# Patient Record
Sex: FEMALE | Race: WHITE | NOT HISPANIC OR LATINO | Employment: PART TIME
[De-identification: names, ages, dates, MRNs, and addresses within clinical notes are randomized per-mention and may not be internally consistent; named-entity substitution may affect disease eponyms.]

---

## 2017-05-27 ENCOUNTER — HEALTH MAINTENANCE LETTER (OUTPATIENT)
Age: 37
End: 2017-05-27

## 2017-10-21 ENCOUNTER — ALLIED HEALTH/NURSE VISIT (OUTPATIENT)
Dept: NURSING | Facility: CLINIC | Age: 37
End: 2017-10-21
Payer: COMMERCIAL

## 2017-10-21 DIAGNOSIS — Z23 NEED FOR PROPHYLACTIC VACCINATION AND INOCULATION AGAINST INFLUENZA: Primary | ICD-10-CM

## 2017-10-21 PROCEDURE — 90686 IIV4 VACC NO PRSV 0.5 ML IM: CPT

## 2017-10-21 PROCEDURE — 99207 ZZC NO CHARGE NURSE ONLY: CPT

## 2017-10-21 PROCEDURE — 90471 IMMUNIZATION ADMIN: CPT

## 2017-10-21 NOTE — MR AVS SNAPSHOT
After Visit Summary   10/21/2017    Jaqui Santos    MRN: [id number]           Patient Information     Date Of Birth          1980        Visit Information        Provider Department      10/21/2017 10:55 AM [facility]        Today's Diagnoses     Need for prophylactic vaccination and inoculation against influenza    -  1       Follow-ups after your visit        Who to contact     If you have questions or need follow up information about today's clinic visit or your schedule please contact [facility] directly at [phone].  Normal or non-critical lab and imaging results will be communicated to you by HYGIEIAhart, letter or phone within 4 business days after the clinic has received the results. If you do not hear from us within 7 days, please contact the clinic through Horizontal Systemst or phone. If you have a critical or abnormal lab result, we will notify you by phone as soon as possible.  Submit refill requests through RevoDeals or call your pharmacy and they will forward the refill request to us. Please allow 3 business days for your refill to be completed.          Additional Information About Your Visit        MyChart Information     RevoDeals gives you secure access to your electronic health record. If you see a primary care provider, you can also send messages to your care team and make appointments. If you have questions, please call your primary care clinic.  If you do not have a primary care provider, please call [phone] and they will assist you.        Care EveryWhere ID     This is your Care EveryWhere ID. This could be used by other organizations to access your [facility] medical records  [id number]         Blood Pressure from Last 3 Encounters:   No data found for BP    Weight from Last 3 Encounters:   No data found for Wt              We Performed the Following     FLU VAC, SPLIT VIRUS IM > 3 YO (QUADRIVALENT)  [[id number]]     Vaccine Administration, Initial [[id number]]        Primary Care Provider    None Specified       No primary provider on file.        Equal Access to Services     THEO HERMAN : Hadii aad ku hadmihirkaren Mayelinmateus, gerrifranc romeroshainaha, kearazoey tompkinsjessikafranc de la rosadanefranc, dorothy denissein hayaagrace de la rosapuja clarisatamy zeferino lizarraga. So [facility] [phone].    ATENCIÓN: Si habla español, tiene a fuller disposición servicios gratuitos de asistencia lingüística. Llame al [phone].    We comply with applicable federal civil rights laws and [place] laws. We do not discriminate on the basis of race, color, national origin, age, disability, sex, sexual orientation, or gender identity.            Thank you!     Thank you for choosing [facility]  for your care. Our goal is always to provide you with excellent care. Hearing back from our patients is one way we can continue to improve our services. Please take a few minutes to complete the written survey that you may receive in the mail after your visit with us. Thank you!             Your Updated Medication List - Protect others around you: Learn how to safely use, store and throw away your medicines at www.disposemymeds.org.          This list is accurate as of: 10/21/17 11:50 AM.  Always use your most recent med list.                   Brand Name Dispense Instructions for use Diagnosis    FISH OIL PO      Take  by mouth.        IBUPROFEN PO      Take  by mouth.

## 2018-07-13 ENCOUNTER — RECORDS - HEALTHEAST (OUTPATIENT)
Dept: ADMINISTRATIVE | Facility: OTHER | Age: 38
End: 2018-07-13

## 2018-07-13 ENCOUNTER — PRENATAL OFFICE VISIT - HEALTHEAST (OUTPATIENT)
Dept: MIDWIFE SERVICES | Facility: CLINIC | Age: 38
End: 2018-07-13

## 2018-07-13 DIAGNOSIS — Z77.011 LEAD EXPOSURE: ICD-10-CM

## 2018-07-13 DIAGNOSIS — O09.521 ELDERLY MULTIGRAVIDA IN FIRST TRIMESTER: ICD-10-CM

## 2018-07-13 DIAGNOSIS — Z98.891 HISTORY OF CESAREAN SECTION: ICD-10-CM

## 2018-07-13 DIAGNOSIS — N89.8 VAGINAL IRRITATION: ICD-10-CM

## 2018-07-13 DIAGNOSIS — B37.31 VAGINAL YEAST INFECTION: ICD-10-CM

## 2018-07-13 DIAGNOSIS — O09.529 SUPERVISION OF HIGH-RISK PREGNANCY OF ELDERLY MULTIGRAVIDA: ICD-10-CM

## 2018-07-13 LAB
ABO + RH BLD: NORMAL
ABO + RH BLD: NORMAL
ALBUMIN UR-MCNC: NEGATIVE MG/DL
APPEARANCE UR: CLEAR
BASOPHILS # BLD AUTO: 0.1 THOU/UL (ref 0–0.2)
BASOPHILS NFR BLD AUTO: 1 % (ref 0–2)
BILIRUB UR QL STRIP: NEGATIVE
BLD GP AB SCN SERPL QL: NEGATIVE
CLUE CELLS: ABNORMAL
COLOR UR AUTO: YELLOW
EOSINOPHIL # BLD AUTO: 0.1 THOU/UL (ref 0–0.4)
EOSINOPHIL NFR BLD AUTO: 1 % (ref 0–6)
ERYTHROCYTE [DISTWIDTH] IN BLOOD BY AUTOMATED COUNT: 13.6 % (ref 11–14.5)
GLUCOSE UR STRIP-MCNC: NEGATIVE MG/DL
HBV SURFACE AG SERPL QL IA: NORMAL
HCT VFR BLD AUTO: 37.7 % (ref 35–47)
HGB BLD-MCNC: 12.8 G/DL (ref 12–16)
HGB UR QL STRIP: NEGATIVE
HIV 1+2 AB+HIV1 P24 AG SERPL QL IA: NEGATIVE
HIV 1+2 AB+HIV1 P24 AG SERPL QL IA: NORMAL
KETONES UR STRIP-MCNC: NEGATIVE MG/DL
LEUKOCYTE ESTERASE UR QL STRIP: NEGATIVE
LYMPHOCYTES # BLD AUTO: 1.5 THOU/UL (ref 0.8–4.4)
LYMPHOCYTES NFR BLD AUTO: 17 % (ref 20–40)
MCH RBC QN AUTO: 31.5 PG (ref 27–34)
MCHC RBC AUTO-ENTMCNC: 34 G/DL (ref 32–36)
MCV RBC AUTO: 93 FL (ref 80–100)
MONOCYTES # BLD AUTO: 0.5 THOU/UL (ref 0–0.9)
MONOCYTES NFR BLD AUTO: 6 % (ref 2–10)
NEUTROPHILS # BLD AUTO: 6.6 THOU/UL (ref 2–7.7)
NEUTROPHILS NFR BLD AUTO: 75 % (ref 50–70)
NITRATE UR QL: NEGATIVE
PH UR STRIP: 7 [PH] (ref 5–8)
PLATELET # BLD AUTO: 264 THOU/UL (ref 140–440)
PMV BLD AUTO: 9 FL (ref 8.5–12.5)
RBC # BLD AUTO: 4.06 MILL/UL (ref 3.8–5.4)
RUBELLA ABY IGG: NORMAL
SP GR UR STRIP: 1.01 (ref 1–1.03)
TRICHOMONAS, WET PREP: ABNORMAL
UROBILINOGEN UR STRIP-ACNC: NORMAL
WBC: 8.8 THOU/UL (ref 4–11)
YEAST, WET PREP: ABNORMAL

## 2018-07-13 ASSESSMENT — MIFFLIN-ST. JEOR: SCORE: 1280.97

## 2018-07-14 ENCOUNTER — COMMUNICATION - HEALTHEAST (OUTPATIENT)
Dept: MIDWIFE SERVICES | Facility: CLINIC | Age: 38
End: 2018-07-14

## 2018-07-14 LAB
ANTIBODY SCREEN: NEGATIVE
BACTERIA SPEC CULT: NORMAL
COLLECTION METHOD: NORMAL
HBV SURFACE AG SERPL QL IA: NEGATIVE
LEAD BLD-MCNC: NORMAL UG/DL
LEAD RETEST: NO
T PALLIDUM AB SER QL: NEGATIVE

## 2018-07-16 ENCOUNTER — COMMUNICATION - HEALTHEAST (OUTPATIENT)
Dept: OBGYN | Facility: CLINIC | Age: 38
End: 2018-07-16

## 2018-07-16 LAB
ABO/RH(D): NORMAL
ABORH REPEAT: NORMAL
GUARDIAN FIRST NAME: NORMAL
GUARDIAN LAST NAME: NORMAL
HEALTH CARE PROVIDER CITY: NORMAL
HEALTH CARE PROVIDER NAME: NORMAL
HEALTH CARE PROVIDER PHONE: NORMAL
HEALTH CARE PROVIDER STATE: NORMAL
HEALTH CARE PROVIDER STREET ADDRESS: NORMAL
HEALTH CARE PROVIDER ZIP CODE: NORMAL
HPV SOURCE: NORMAL
HUMAN PAPILLOMA VIRUS 16 DNA: NEGATIVE
HUMAN PAPILLOMA VIRUS 18 DNA: NEGATIVE
HUMAN PAPILLOMA VIRUS FINAL DIAGNOSIS: NORMAL
HUMAN PAPILLOMA VIRUS OTHER HR: NEGATIVE
LEAD, B: <1 MCG/DL (ref 0–4.9)
PATIENT CITY: NORMAL
PATIENT COUNTY: NORMAL
PATIENT EMPLOYER: NORMAL
PATIENT ETHNICITY: NORMAL
PATIENT HOME PHONE: NORMAL
PATIENT OCCUPATION: NORMAL
PATIENT RACE: NORMAL
PATIENT STATE: NORMAL
PATIENT STREET ADDRESS: NORMAL
PATIENT ZIP CODE: NORMAL
RUBV IGG SERPL QL IA: POSITIVE
SPECIMEN DESCRIPTION: NORMAL
SUBMITTING LABORATORY PHONE: NORMAL
VENOUS/CAPILLARY: NORMAL

## 2018-07-23 LAB

## 2018-07-25 ENCOUNTER — COMMUNICATION - HEALTHEAST (OUTPATIENT)
Dept: MIDWIFE SERVICES | Facility: CLINIC | Age: 38
End: 2018-07-25

## 2018-07-25 DIAGNOSIS — O09.521 ELDERLY MULTIGRAVIDA IN FIRST TRIMESTER: ICD-10-CM

## 2018-07-27 ENCOUNTER — PRENATAL OFFICE VISIT - HEALTHEAST (OUTPATIENT)
Dept: MIDWIFE SERVICES | Facility: CLINIC | Age: 38
End: 2018-07-27

## 2018-07-27 DIAGNOSIS — M62.08 DIASTASIS RECTI: ICD-10-CM

## 2018-07-27 ASSESSMENT — MIFFLIN-ST. JEOR: SCORE: 1290.95

## 2018-07-29 ENCOUNTER — COMMUNICATION - HEALTHEAST (OUTPATIENT)
Dept: MIDWIFE SERVICES | Facility: CLINIC | Age: 38
End: 2018-07-29

## 2018-08-17 ENCOUNTER — PRENATAL OFFICE VISIT - HEALTHEAST (OUTPATIENT)
Dept: MIDWIFE SERVICES | Facility: CLINIC | Age: 38
End: 2018-08-17

## 2018-08-17 DIAGNOSIS — O09.529 SUPERVISION OF HIGH-RISK PREGNANCY OF ELDERLY MULTIGRAVIDA: ICD-10-CM

## 2018-08-17 ASSESSMENT — MIFFLIN-ST. JEOR: SCORE: 1310.45

## 2018-08-21 LAB
AFP MOM: 1.29 MOM
ALPHA FETO PROTEIN: 46.6 NG/ML
CALCULATED AGE AT EDD: NORMAL
COLLECTION DATE: NORMAL
CURRENT CIGARETTE SMOKING STATUS: NORMAL
EDD BY LMP: NORMAL
GA ON COLLECTION BY DATES: NORMAL WK,D
GA USED IN RISK ESTIMATE: NORMAL
GENERAL TEST INFORMATION: NORMAL
INITIAL OR REPEAT TESTING: NORMAL
INSULIN DIABETIC: NO
INTERPRETATION: NORMAL
IVF PREGNANCY: NO
Lab: NORMAL
NEURAL TUBE DEFECT RISK ESTIMATE: NORMAL
NUMBER OF CHORIONS: NORMAL
NUMBER OF FETUSES:: 1
PATIENT OR FATHER OF BABY HAS A NTD: NO
PATIENT WEIGHT: 141 LBS
PHYSICIAN PHONE NUMBER: NORMAL
PREV PREGNANCY W/ NEURAL TUBE DEFECT: NO
PT DATE OF BIRTH: NORMAL
RACE OF MOTHER: NORMAL
RECOMMENDED FOLLOW UP: NORMAL

## 2018-09-12 ENCOUNTER — COMMUNICATION - HEALTHEAST (OUTPATIENT)
Dept: FAMILY MEDICINE | Facility: CLINIC | Age: 38
End: 2018-09-12

## 2018-09-20 ENCOUNTER — PRENATAL OFFICE VISIT - HEALTHEAST (OUTPATIENT)
Dept: MIDWIFE SERVICES | Facility: CLINIC | Age: 38
End: 2018-09-20

## 2018-09-20 DIAGNOSIS — O09.529 SUPERVISION OF HIGH-RISK PREGNANCY OF ELDERLY MULTIGRAVIDA: ICD-10-CM

## 2018-09-20 ASSESSMENT — MIFFLIN-ST. JEOR: SCORE: 1324.06

## 2018-09-21 ENCOUNTER — HOSPITAL ENCOUNTER (OUTPATIENT)
Dept: ULTRASOUND IMAGING | Facility: CLINIC | Age: 38
Discharge: HOME | End: 2018-09-21
Attending: ADVANCED PRACTICE MIDWIFE

## 2018-09-21 DIAGNOSIS — O09.529 SUPERVISION OF HIGH-RISK PREGNANCY OF ELDERLY MULTIGRAVIDA: ICD-10-CM

## 2018-09-26 ENCOUNTER — AMBULATORY - HEALTHEAST (OUTPATIENT)
Dept: MIDWIFE SERVICES | Facility: CLINIC | Age: 38
End: 2018-09-26

## 2018-10-19 ENCOUNTER — COMMUNICATION - HEALTHEAST (OUTPATIENT)
Dept: FAMILY MEDICINE | Facility: CLINIC | Age: 38
End: 2018-10-19

## 2018-11-08 ENCOUNTER — OFFICE VISIT - HEALTHEAST (OUTPATIENT)
Dept: OBGYN | Facility: CLINIC | Age: 38
End: 2018-11-08

## 2018-11-08 ENCOUNTER — PRENATAL OFFICE VISIT - HEALTHEAST (OUTPATIENT)
Dept: MIDWIFE SERVICES | Facility: CLINIC | Age: 38
End: 2018-11-08

## 2018-11-08 DIAGNOSIS — O09.529 SUPERVISION OF HIGH-RISK PREGNANCY OF ELDERLY MULTIGRAVIDA: ICD-10-CM

## 2018-11-08 DIAGNOSIS — O34.219 H/O CESAREAN SECTION COMPLICATING PREGNANCY: ICD-10-CM

## 2018-11-08 DIAGNOSIS — Z98.891 HISTORY OF CESAREAN SECTION: ICD-10-CM

## 2018-11-08 LAB
FASTING STATUS PATIENT QL REPORTED: NO
GLU GEST SCREEN 1HR 50G: 71
GLUCOSE 1H P 50 G GLC PO SERPL-MCNC: 71 MG/DL (ref 70–139)
HGB BLD-MCNC: 12.4 G/DL (ref 12–16)

## 2018-11-08 ASSESSMENT — MIFFLIN-ST. JEOR
SCORE: 1373.96
SCORE: 1373.96

## 2018-11-16 ENCOUNTER — ALLIED HEALTH/NURSE VISIT (OUTPATIENT)
Dept: NURSING | Facility: CLINIC | Age: 38
End: 2018-11-16
Payer: COMMERCIAL

## 2018-11-16 DIAGNOSIS — Z23 NEED FOR PROPHYLACTIC VACCINATION AND INOCULATION AGAINST INFLUENZA: Primary | ICD-10-CM

## 2018-11-16 PROCEDURE — 90471 IMMUNIZATION ADMIN: CPT

## 2018-11-16 PROCEDURE — 99207 ZZC NO CHARGE NURSE ONLY: CPT

## 2018-11-16 PROCEDURE — 90686 IIV4 VACC NO PRSV 0.5 ML IM: CPT

## 2018-11-16 NOTE — MR AVS SNAPSHOT
After Visit Summary   11/16/2018    Jaqui Santos    MRN: [id number]           Patient Information     Date Of Birth          1980        Visit Information        Provider Department      11/16/2018 11:00 AM CARE COORDINATOR [facility]        Today's Diagnoses     Need for prophylactic vaccination and inoculation against influenza    -  1       Follow-ups after your visit        Who to contact     If you have questions or need follow up information about today's clinic visit or your schedule please contact [facility] directly at [phone].  Normal or non-critical lab and imaging results will be communicated to you by Bungles Jungleshart, letter or phone within 4 business days after the clinic has received the results. If you do not hear from us within 7 days, please contact the clinic through [email or web address] or phone. If you have a critical or abnormal lab result, we will notify you by phone as soon as possible.  Submit refill requests through Metafor Software or call your pharmacy and they will forward the refill request to us. Please allow 3 business days for your refill to be completed.          Additional Information About Your Visit        MyChart Information     [employer] Software gives you secure access to your electronic health record. If you see a primary care provider, you can also send messages to your care team and make appointments. If you have questions, please call your primary care clinic.  If you do not have a primary care provider, please call [phone] and they will assist you.        Care EveryWhere ID     This is your Care EveryWhere ID. This could be used by other organizations to access your [facility] medical records  [id number]         Blood Pressure from Last 3 Encounters:   No data found for BP    Weight from Last 3 Encounters:   No data found for Wt              We Performed the Following     FLU VACCINE, SPLIT VIRUS, IM (QUADRIVALENT)  [[id number]]- >3 YRS     Vaccine Administration, Initial [[id number]]        Primary Care Provider    None Specified       No primary provider on file.        Equal Access to Services     THEO HERMAN : Hadii aad ku hadmihirkaren Blas, gerrifranc romeroshainaha, trav turnerrica henao, dorothy urbina estrellapuja crockett lapriscillagrace zia. So [facility] [place] [phone].    ATENCIÓN: Si habla español, tiene a fuller disposición servicios gratuitos de asistencia lingüística. Llame al [phone].    We comply with applicable federal civil rights laws and [place] laws. We do not discriminate on the basis of race, color, national origin, age, disability, sex, sexual orientation, or gender identity.            Thank you!     Thank you for choosing [facility]  for your care. Our goal is always to provide you with excellent care. Hearing back from our patients is one way we can continue to improve our services. Please take a few minutes to complete the written survey that you may receive in the mail after your visit with us. Thank you!             Your Updated Medication List - Protect others around you: Learn how to safely use, store and throw away your medicines at www.disposemymeds.org.          This list is accurate as of 11/16/18 11:40 AM.  Always use your most recent med list.                   Brand Name Dispense Instructions for use Diagnosis    FISH OIL PO      Take  by mouth.        IBUPROFEN PO      Take  by mouth.

## 2018-12-07 ENCOUNTER — PRENATAL OFFICE VISIT - HEALTHEAST (OUTPATIENT)
Dept: MIDWIFE SERVICES | Facility: CLINIC | Age: 38
End: 2018-12-07

## 2018-12-07 DIAGNOSIS — O09.529 SUPERVISION OF HIGH-RISK PREGNANCY OF ELDERLY MULTIGRAVIDA: ICD-10-CM

## 2018-12-07 ASSESSMENT — MIFFLIN-ST. JEOR: SCORE: 1396.64

## 2018-12-20 ENCOUNTER — PRENATAL OFFICE VISIT - HEALTHEAST (OUTPATIENT)
Dept: MIDWIFE SERVICES | Facility: CLINIC | Age: 38
End: 2018-12-20

## 2018-12-20 DIAGNOSIS — O09.529 SUPERVISION OF HIGH-RISK PREGNANCY OF ELDERLY MULTIGRAVIDA: ICD-10-CM

## 2018-12-20 ASSESSMENT — MIFFLIN-ST. JEOR: SCORE: 1396.64

## 2019-01-03 ENCOUNTER — PRENATAL OFFICE VISIT - HEALTHEAST (OUTPATIENT)
Dept: MIDWIFE SERVICES | Facility: CLINIC | Age: 39
End: 2019-01-03

## 2019-01-03 DIAGNOSIS — O09.529 SUPERVISION OF HIGH-RISK PREGNANCY OF ELDERLY MULTIGRAVIDA: ICD-10-CM

## 2019-01-03 LAB
GROUP B STREP PCR: NEGATIVE
HGB BLD-MCNC: 12.3 G/DL (ref 12–16)

## 2019-01-03 ASSESSMENT — MIFFLIN-ST. JEOR: SCORE: 1405.71

## 2019-01-04 LAB
ALLERGIC TO PENICILLIN: NO
GP B STREP DNA SPEC QL NAA+PROBE: NEGATIVE

## 2019-01-12 ENCOUNTER — COMMUNICATION - HEALTHEAST (OUTPATIENT)
Dept: OBGYN | Facility: HOSPITAL | Age: 39
End: 2019-01-12

## 2019-01-17 ENCOUNTER — COMMUNICATION - HEALTHEAST (OUTPATIENT)
Dept: MIDWIFE SERVICES | Facility: CLINIC | Age: 39
End: 2019-01-17

## 2019-02-07 ENCOUNTER — COMMUNICATION - HEALTHEAST (OUTPATIENT)
Dept: MIDWIFE SERVICES | Facility: CLINIC | Age: 39
End: 2019-02-07

## 2019-02-12 ENCOUNTER — HOSPITAL ENCOUNTER (INPATIENT)
Facility: CLINIC | Age: 39
LOS: 4 days | Discharge: HOME | End: 2019-02-16
Attending: OBSTETRICS & GYNECOLOGY | Admitting: OBSTETRICS & GYNECOLOGY
Payer: COMMERCIAL

## 2019-02-12 DIAGNOSIS — Z98.891 S/P CESAREAN SECTION: Primary | ICD-10-CM

## 2019-02-12 PROCEDURE — 12000001 ZZH R&B MED SURG/OB UMMC

## 2019-02-12 PROCEDURE — G0463 HOSPITAL OUTPT CLINIC VISIT: HCPCS

## 2019-02-12 RX ORDER — NALOXONE HYDROCHLORIDE 0.4 MG/ML
.1-.4 INJECTION, SOLUTION INTRAMUSCULAR; INTRAVENOUS; SUBCUTANEOUS
Status: DISCONTINUED | OUTPATIENT
Start: 2019-02-12 | End: 2019-02-13

## 2019-02-12 RX ORDER — OXYTOCIN/0.9 % SODIUM CHLORIDE 30/500 ML
100-340 PLASTIC BAG, INJECTION (ML) INTRAVENOUS CONTINUOUS PRN
Status: DISCONTINUED | OUTPATIENT
Start: 2019-02-12 | End: 2019-02-13

## 2019-02-12 RX ORDER — PRENATAL VIT/IRON FUM/FOLIC AC 27MG-0.8MG
1 TABLET ORAL DAILY
COMMUNITY

## 2019-02-12 RX ORDER — SODIUM CHLORIDE, SODIUM LACTATE, POTASSIUM CHLORIDE, CALCIUM CHLORIDE 600; 310; 30; 20 MG/100ML; MG/100ML; MG/100ML; MG/100ML
INJECTION, SOLUTION INTRAVENOUS CONTINUOUS
Status: DISCONTINUED | OUTPATIENT
Start: 2019-02-12 | End: 2019-02-13

## 2019-02-12 RX ORDER — ACETAMINOPHEN 325 MG/1
650 TABLET ORAL EVERY 4 HOURS PRN
Status: DISCONTINUED | OUTPATIENT
Start: 2019-02-12 | End: 2019-02-13

## 2019-02-12 RX ORDER — OXYTOCIN 10 [USP'U]/ML
10 INJECTION, SOLUTION INTRAMUSCULAR; INTRAVENOUS
Status: DISCONTINUED | OUTPATIENT
Start: 2019-02-12 | End: 2019-02-13

## 2019-02-12 RX ORDER — ONDANSETRON 2 MG/ML
4 INJECTION INTRAMUSCULAR; INTRAVENOUS EVERY 6 HOURS PRN
Status: DISCONTINUED | OUTPATIENT
Start: 2019-02-12 | End: 2019-02-13

## 2019-02-12 RX ORDER — PENICILLIN G POTASSIUM 5000000 [IU]/1
5 INJECTION, POWDER, FOR SOLUTION INTRAMUSCULAR; INTRAVENOUS ONCE
Status: DISCONTINUED | OUTPATIENT
Start: 2019-02-12 | End: 2019-02-13

## 2019-02-12 RX ORDER — METHYLERGONOVINE MALEATE 0.2 MG/ML
200 INJECTION INTRAVENOUS
Status: DISCONTINUED | OUTPATIENT
Start: 2019-02-12 | End: 2019-02-13

## 2019-02-12 RX ORDER — IBUPROFEN 800 MG/1
800 TABLET, FILM COATED ORAL
Status: DISCONTINUED | OUTPATIENT
Start: 2019-02-12 | End: 2019-02-13

## 2019-02-12 RX ORDER — CARBOPROST TROMETHAMINE 250 UG/ML
250 INJECTION, SOLUTION INTRAMUSCULAR
Status: DISCONTINUED | OUTPATIENT
Start: 2019-02-12 | End: 2019-02-13

## 2019-02-12 RX ORDER — OXYCODONE AND ACETAMINOPHEN 5; 325 MG/1; MG/1
1 TABLET ORAL
Status: DISCONTINUED | OUTPATIENT
Start: 2019-02-12 | End: 2019-02-13

## 2019-02-12 SDOH — HEALTH STABILITY: MENTAL HEALTH: HOW OFTEN DO YOU HAVE A DRINK CONTAINING ALCOHOL?: NEVER

## 2019-02-13 ENCOUNTER — ANESTHESIA (OUTPATIENT)
Dept: OBGYN | Facility: CLINIC | Age: 39
End: 2019-02-13
Payer: COMMERCIAL

## 2019-02-13 ENCOUNTER — ANESTHESIA EVENT (OUTPATIENT)
Dept: OBGYN | Facility: CLINIC | Age: 39
End: 2019-02-13
Payer: COMMERCIAL

## 2019-02-13 PROBLEM — Z98.891 S/P CESAREAN SECTION: Status: ACTIVE | Noted: 2019-02-13

## 2019-02-13 LAB
BASOPHILS # BLD AUTO: 0 10E9/L (ref 0–0.2)
BASOPHILS NFR BLD AUTO: 0.2 %
DIFFERENTIAL METHOD BLD: NORMAL
EOSINOPHIL # BLD AUTO: 0.1 10E9/L (ref 0–0.7)
EOSINOPHIL NFR BLD AUTO: 1.3 %
ERYTHROCYTE [DISTWIDTH] IN BLOOD BY AUTOMATED COUNT: 13.8 % (ref 10–15)
HCT VFR BLD AUTO: 36.3 % (ref 35–47)
HGB BLD-MCNC: 12.5 G/DL (ref 11.7–15.7)
IMM GRANULOCYTES # BLD: 0 10E9/L (ref 0–0.4)
IMM GRANULOCYTES NFR BLD: 0.5 %
LYMPHOCYTES # BLD AUTO: 1.6 10E9/L (ref 0.8–5.3)
LYMPHOCYTES NFR BLD AUTO: 18.1 %
MCH RBC QN AUTO: 32.3 PG (ref 26.5–33)
MCHC RBC AUTO-ENTMCNC: 34.4 G/DL (ref 31.5–36.5)
MCV RBC AUTO: 94 FL (ref 78–100)
MONOCYTES # BLD AUTO: 0.7 10E9/L (ref 0–1.3)
MONOCYTES NFR BLD AUTO: 8.5 %
NEUTROPHILS # BLD AUTO: 6.2 10E9/L (ref 1.6–8.3)
NEUTROPHILS NFR BLD AUTO: 71.4 %
NRBC # BLD AUTO: 0 10*3/UL
NRBC BLD AUTO-RTO: 0 /100
PLATELET # BLD AUTO: 238 10E9/L (ref 150–450)
RBC # BLD AUTO: 3.87 10E12/L (ref 3.8–5.2)
T PALLIDUM AB SER QL: NONREACTIVE
WBC # BLD AUTO: 8.7 10E9/L (ref 4–11)

## 2019-02-13 PROCEDURE — 71000014 ZZH RECOVERY PHASE 1 LEVEL 2 FIRST HR: Performed by: OBSTETRICS & GYNECOLOGY

## 2019-02-13 PROCEDURE — 37000008 ZZH ANESTHESIA TECHNICAL FEE, 1ST 30 MIN: Performed by: OBSTETRICS & GYNECOLOGY

## 2019-02-13 PROCEDURE — 12000001 ZZH R&B MED SURG/OB UMMC

## 2019-02-13 PROCEDURE — 85025 COMPLETE CBC W/AUTO DIFF WBC: CPT | Performed by: STUDENT IN AN ORGANIZED HEALTH CARE EDUCATION/TRAINING PROGRAM

## 2019-02-13 PROCEDURE — 37000009 ZZH ANESTHESIA TECHNICAL FEE, EACH ADDTL 15 MIN: Performed by: OBSTETRICS & GYNECOLOGY

## 2019-02-13 PROCEDURE — 27110028 ZZH OR GENERAL SUPPLY NON-STERILE: Performed by: OBSTETRICS & GYNECOLOGY

## 2019-02-13 PROCEDURE — 25000132 ZZH RX MED GY IP 250 OP 250 PS 637

## 2019-02-13 PROCEDURE — 27210794 ZZH OR GENERAL SUPPLY STERILE: Performed by: OBSTETRICS & GYNECOLOGY

## 2019-02-13 PROCEDURE — 0064U ANTB TP TOTAL&RPR IA QUAL: CPT | Performed by: STUDENT IN AN ORGANIZED HEALTH CARE EDUCATION/TRAINING PROGRAM

## 2019-02-13 PROCEDURE — 25800030 ZZH RX IP 258 OP 636

## 2019-02-13 PROCEDURE — 71000015 ZZH RECOVERY PHASE 1 LEVEL 2 EA ADDTL HR: Performed by: OBSTETRICS & GYNECOLOGY

## 2019-02-13 PROCEDURE — 25000132 ZZH RX MED GY IP 250 OP 250 PS 637: Performed by: STUDENT IN AN ORGANIZED HEALTH CARE EDUCATION/TRAINING PROGRAM

## 2019-02-13 PROCEDURE — 25000125 ZZHC RX 250

## 2019-02-13 PROCEDURE — 36000057 ZZH SURGERY LEVEL 3 1ST 30 MIN - UMMC: Performed by: OBSTETRICS & GYNECOLOGY

## 2019-02-13 PROCEDURE — 25000128 H RX IP 250 OP 636

## 2019-02-13 PROCEDURE — 25000125 ZZHC RX 250: Performed by: STUDENT IN AN ORGANIZED HEALTH CARE EDUCATION/TRAINING PROGRAM

## 2019-02-13 PROCEDURE — 36000059 ZZH SURGERY LEVEL 3 EA 15 ADDTL MIN UMMC: Performed by: OBSTETRICS & GYNECOLOGY

## 2019-02-13 PROCEDURE — C9290 INJ, BUPIVACAINE LIPOSOME: HCPCS

## 2019-02-13 PROCEDURE — 40000170 ZZH STATISTIC PRE-PROCEDURE ASSESSMENT II: Performed by: OBSTETRICS & GYNECOLOGY

## 2019-02-13 PROCEDURE — 25000128 H RX IP 250 OP 636: Performed by: STUDENT IN AN ORGANIZED HEALTH CARE EDUCATION/TRAINING PROGRAM

## 2019-02-13 RX ORDER — KETOROLAC TROMETHAMINE 30 MG/ML
30 INJECTION, SOLUTION INTRAMUSCULAR; INTRAVENOUS EVERY 6 HOURS
Status: DISPENSED | OUTPATIENT
Start: 2019-02-13 | End: 2019-02-14

## 2019-02-13 RX ORDER — OXYCODONE HYDROCHLORIDE 5 MG/1
5-10 TABLET ORAL EVERY 4 HOURS PRN
Status: DISCONTINUED | OUTPATIENT
Start: 2019-02-13 | End: 2019-02-16 | Stop reason: HOSPADM

## 2019-02-13 RX ORDER — NALOXONE HYDROCHLORIDE 0.4 MG/ML
.1-.4 INJECTION, SOLUTION INTRAMUSCULAR; INTRAVENOUS; SUBCUTANEOUS
Status: DISCONTINUED | OUTPATIENT
Start: 2019-02-13 | End: 2019-02-13

## 2019-02-13 RX ORDER — EPHEDRINE SULFATE 50 MG/ML
INJECTION, SOLUTION INTRAMUSCULAR; INTRAVENOUS; SUBCUTANEOUS PRN
Status: DISCONTINUED | OUTPATIENT
Start: 2019-02-13 | End: 2019-02-13

## 2019-02-13 RX ORDER — DEXTROSE, SODIUM CHLORIDE, SODIUM LACTATE, POTASSIUM CHLORIDE, AND CALCIUM CHLORIDE 5; .6; .31; .03; .02 G/100ML; G/100ML; G/100ML; G/100ML; G/100ML
INJECTION, SOLUTION INTRAVENOUS CONTINUOUS
Status: DISCONTINUED | OUTPATIENT
Start: 2019-02-13 | End: 2019-02-16 | Stop reason: HOSPADM

## 2019-02-13 RX ORDER — SODIUM CHLORIDE, SODIUM LACTATE, POTASSIUM CHLORIDE, CALCIUM CHLORIDE 600; 310; 30; 20 MG/100ML; MG/100ML; MG/100ML; MG/100ML
INJECTION, SOLUTION INTRAVENOUS CONTINUOUS PRN
Status: DISCONTINUED | OUTPATIENT
Start: 2019-02-13 | End: 2019-02-13

## 2019-02-13 RX ORDER — CEFAZOLIN SODIUM 1 G/3ML
1 INJECTION, POWDER, FOR SOLUTION INTRAMUSCULAR; INTRAVENOUS SEE ADMIN INSTRUCTIONS
Status: DISCONTINUED | OUTPATIENT
Start: 2019-02-13 | End: 2019-02-13

## 2019-02-13 RX ORDER — MORPHINE SULFATE 1 MG/ML
INJECTION, SOLUTION EPIDURAL; INTRATHECAL; INTRAVENOUS PRN
Status: DISCONTINUED | OUTPATIENT
Start: 2019-02-13 | End: 2019-02-13

## 2019-02-13 RX ORDER — AMOXICILLIN 250 MG
1 CAPSULE ORAL 2 TIMES DAILY PRN
Status: DISCONTINUED | OUTPATIENT
Start: 2019-02-13 | End: 2019-02-16 | Stop reason: HOSPADM

## 2019-02-13 RX ORDER — FENTANYL CITRATE 50 UG/ML
INJECTION, SOLUTION INTRAMUSCULAR; INTRAVENOUS PRN
Status: DISCONTINUED | OUTPATIENT
Start: 2019-02-13 | End: 2019-02-13

## 2019-02-13 RX ORDER — AMOXICILLIN 250 MG
2 CAPSULE ORAL 2 TIMES DAILY PRN
Status: DISCONTINUED | OUTPATIENT
Start: 2019-02-13 | End: 2019-02-16 | Stop reason: HOSPADM

## 2019-02-13 RX ORDER — ONDANSETRON 2 MG/ML
4 INJECTION INTRAMUSCULAR; INTRAVENOUS EVERY 6 HOURS PRN
Status: DISCONTINUED | OUTPATIENT
Start: 2019-02-13 | End: 2019-02-16 | Stop reason: HOSPADM

## 2019-02-13 RX ORDER — OXYTOCIN/0.9 % SODIUM CHLORIDE 30/500 ML
100 PLASTIC BAG, INJECTION (ML) INTRAVENOUS CONTINUOUS
Status: DISCONTINUED | OUTPATIENT
Start: 2019-02-13 | End: 2019-02-16 | Stop reason: HOSPADM

## 2019-02-13 RX ORDER — OXYTOCIN 10 [USP'U]/ML
10 INJECTION, SOLUTION INTRAMUSCULAR; INTRAVENOUS
Status: DISCONTINUED | OUTPATIENT
Start: 2019-02-13 | End: 2019-02-16 | Stop reason: HOSPADM

## 2019-02-13 RX ORDER — MORPHINE SULFATE 1 MG/ML
0.1 INJECTION, SOLUTION EPIDURAL; INTRATHECAL; INTRAVENOUS ONCE
Status: DISCONTINUED | OUTPATIENT
Start: 2019-02-13 | End: 2019-02-13

## 2019-02-13 RX ORDER — SODIUM CHLORIDE, SODIUM LACTATE, POTASSIUM CHLORIDE, CALCIUM CHLORIDE 600; 310; 30; 20 MG/100ML; MG/100ML; MG/100ML; MG/100ML
INJECTION, SOLUTION INTRAVENOUS
Status: COMPLETED
Start: 2019-02-13 | End: 2019-02-13

## 2019-02-13 RX ORDER — ONDANSETRON 2 MG/ML
INJECTION INTRAMUSCULAR; INTRAVENOUS PRN
Status: DISCONTINUED | OUTPATIENT
Start: 2019-02-13 | End: 2019-02-13

## 2019-02-13 RX ORDER — IBUPROFEN 800 MG/1
800 TABLET, FILM COATED ORAL EVERY 6 HOURS PRN
Status: DISCONTINUED | OUTPATIENT
Start: 2019-02-13 | End: 2019-02-16 | Stop reason: HOSPADM

## 2019-02-13 RX ORDER — OXYTOCIN/0.9 % SODIUM CHLORIDE 30/500 ML
PLASTIC BAG, INJECTION (ML) INTRAVENOUS CONTINUOUS PRN
Status: DISCONTINUED | OUTPATIENT
Start: 2019-02-13 | End: 2019-02-13

## 2019-02-13 RX ORDER — SIMETHICONE 80 MG
80 TABLET,CHEWABLE ORAL 4 TIMES DAILY PRN
Status: DISCONTINUED | OUTPATIENT
Start: 2019-02-13 | End: 2019-02-16 | Stop reason: HOSPADM

## 2019-02-13 RX ORDER — SODIUM CHLORIDE, SODIUM LACTATE, POTASSIUM CHLORIDE, CALCIUM CHLORIDE 600; 310; 30; 20 MG/100ML; MG/100ML; MG/100ML; MG/100ML
INJECTION, SOLUTION INTRAVENOUS CONTINUOUS
Status: DISCONTINUED | OUTPATIENT
Start: 2019-02-13 | End: 2019-02-13

## 2019-02-13 RX ORDER — NALOXONE HYDROCHLORIDE 0.4 MG/ML
.1-.4 INJECTION, SOLUTION INTRAMUSCULAR; INTRAVENOUS; SUBCUTANEOUS
Status: DISCONTINUED | OUTPATIENT
Start: 2019-02-13 | End: 2019-02-16 | Stop reason: HOSPADM

## 2019-02-13 RX ORDER — MORPHINE SULFATE 1 MG/ML
INJECTION, SOLUTION EPIDURAL; INTRATHECAL; INTRAVENOUS
Status: DISCONTINUED
Start: 2019-02-13 | End: 2019-02-13 | Stop reason: HOSPADM

## 2019-02-13 RX ORDER — CITRIC ACID/SODIUM CITRATE 334-500MG
30 SOLUTION, ORAL ORAL
Status: DISCONTINUED | OUTPATIENT
Start: 2019-02-13 | End: 2019-02-13

## 2019-02-13 RX ORDER — CEFAZOLIN SODIUM 2 G/100ML
INJECTION, SOLUTION INTRAVENOUS
Status: DISCONTINUED
Start: 2019-02-13 | End: 2019-02-13 | Stop reason: HOSPADM

## 2019-02-13 RX ORDER — LANOLIN 100 %
OINTMENT (GRAM) TOPICAL
Status: DISCONTINUED | OUTPATIENT
Start: 2019-02-13 | End: 2019-02-16 | Stop reason: HOSPADM

## 2019-02-13 RX ORDER — ONDANSETRON 4 MG/1
4 TABLET, ORALLY DISINTEGRATING ORAL EVERY 30 MIN PRN
Status: DISCONTINUED | OUTPATIENT
Start: 2019-02-13 | End: 2019-02-13

## 2019-02-13 RX ORDER — CARBOPROST TROMETHAMINE 250 UG/ML
250 INJECTION, SOLUTION INTRAMUSCULAR
Status: DISCONTINUED | OUTPATIENT
Start: 2019-02-13 | End: 2019-02-16 | Stop reason: HOSPADM

## 2019-02-13 RX ORDER — KETOROLAC TROMETHAMINE 30 MG/ML
INJECTION, SOLUTION INTRAMUSCULAR; INTRAVENOUS PRN
Status: DISCONTINUED | OUTPATIENT
Start: 2019-02-13 | End: 2019-02-13

## 2019-02-13 RX ORDER — CITRIC ACID/SODIUM CITRATE 334-500MG
30 SOLUTION, ORAL ORAL ONCE
Status: COMPLETED | OUTPATIENT
Start: 2019-02-13 | End: 2019-02-13

## 2019-02-13 RX ORDER — EPHEDRINE SULFATE 50 MG/ML
5 INJECTION, SOLUTION INTRAMUSCULAR; INTRAVENOUS; SUBCUTANEOUS
Status: DISCONTINUED | OUTPATIENT
Start: 2019-02-13 | End: 2019-02-13

## 2019-02-13 RX ORDER — ACETAMINOPHEN 325 MG/1
650 TABLET ORAL EVERY 4 HOURS PRN
Status: DISCONTINUED | OUTPATIENT
Start: 2019-02-16 | End: 2019-02-16 | Stop reason: HOSPADM

## 2019-02-13 RX ORDER — HYDROCORTISONE 2.5 %
CREAM (GRAM) TOPICAL 3 TIMES DAILY PRN
Status: DISCONTINUED | OUTPATIENT
Start: 2019-02-13 | End: 2019-02-16 | Stop reason: HOSPADM

## 2019-02-13 RX ORDER — ACETAMINOPHEN 325 MG/1
975 TABLET ORAL EVERY 8 HOURS
Status: COMPLETED | OUTPATIENT
Start: 2019-02-13 | End: 2019-02-16

## 2019-02-13 RX ORDER — BUPIVACAINE HYDROCHLORIDE 7.5 MG/ML
INJECTION, SOLUTION INTRASPINAL
Status: DISCONTINUED
Start: 2019-02-13 | End: 2019-02-13 | Stop reason: HOSPADM

## 2019-02-13 RX ORDER — OXYTOCIN/0.9 % SODIUM CHLORIDE 30/500 ML
340 PLASTIC BAG, INJECTION (ML) INTRAVENOUS CONTINUOUS PRN
Status: DISCONTINUED | OUTPATIENT
Start: 2019-02-13 | End: 2019-02-16 | Stop reason: HOSPADM

## 2019-02-13 RX ORDER — FENTANYL CITRATE 50 UG/ML
25-50 INJECTION, SOLUTION INTRAMUSCULAR; INTRAVENOUS
Status: DISCONTINUED | OUTPATIENT
Start: 2019-02-13 | End: 2019-02-13

## 2019-02-13 RX ORDER — MISOPROSTOL 200 UG/1
800 TABLET ORAL
Status: DISCONTINUED | OUTPATIENT
Start: 2019-02-13 | End: 2019-02-16 | Stop reason: HOSPADM

## 2019-02-13 RX ORDER — BISACODYL 10 MG
10 SUPPOSITORY, RECTAL RECTAL DAILY PRN
Status: DISCONTINUED | OUTPATIENT
Start: 2019-02-15 | End: 2019-02-16 | Stop reason: HOSPADM

## 2019-02-13 RX ORDER — OXYTOCIN/0.9 % SODIUM CHLORIDE 30/500 ML
PLASTIC BAG, INJECTION (ML) INTRAVENOUS
Status: DISCONTINUED
Start: 2019-02-13 | End: 2019-02-13 | Stop reason: HOSPADM

## 2019-02-13 RX ORDER — BUPIVACAINE HYDROCHLORIDE 7.5 MG/ML
INJECTION, SOLUTION INTRASPINAL PRN
Status: DISCONTINUED | OUTPATIENT
Start: 2019-02-13 | End: 2019-02-13

## 2019-02-13 RX ORDER — METHYLERGONOVINE MALEATE 0.2 MG/ML
200 INJECTION INTRAVENOUS
Status: DISCONTINUED | OUTPATIENT
Start: 2019-02-13 | End: 2019-02-16 | Stop reason: HOSPADM

## 2019-02-13 RX ORDER — ONDANSETRON 2 MG/ML
4 INJECTION INTRAMUSCULAR; INTRAVENOUS EVERY 30 MIN PRN
Status: DISCONTINUED | OUTPATIENT
Start: 2019-02-13 | End: 2019-02-13

## 2019-02-13 RX ORDER — LIDOCAINE 40 MG/G
CREAM TOPICAL
Status: DISCONTINUED | OUTPATIENT
Start: 2019-02-13 | End: 2019-02-16 | Stop reason: HOSPADM

## 2019-02-13 RX ORDER — CITRIC ACID/SODIUM CITRATE 334-500MG
SOLUTION, ORAL ORAL
Status: COMPLETED
Start: 2019-02-13 | End: 2019-02-13

## 2019-02-13 RX ORDER — NALBUPHINE HYDROCHLORIDE 10 MG/ML
2.5-5 INJECTION, SOLUTION INTRAMUSCULAR; INTRAVENOUS; SUBCUTANEOUS EVERY 6 HOURS PRN
Status: DISCONTINUED | OUTPATIENT
Start: 2019-02-13 | End: 2019-02-13

## 2019-02-13 RX ORDER — CEFAZOLIN SODIUM 2 G/100ML
2 INJECTION, SOLUTION INTRAVENOUS
Status: COMPLETED | OUTPATIENT
Start: 2019-02-13 | End: 2019-02-13

## 2019-02-13 RX ORDER — LIDOCAINE 40 MG/G
CREAM TOPICAL
Status: DISCONTINUED | OUTPATIENT
Start: 2019-02-13 | End: 2019-02-13

## 2019-02-13 RX ADMIN — Medication 30 ML: at 07:48

## 2019-02-13 RX ADMIN — CEFAZOLIN SODIUM 2 G: 2 INJECTION, SOLUTION INTRAVENOUS at 08:40

## 2019-02-13 RX ADMIN — KETOROLAC TROMETHAMINE 30 MG: 30 INJECTION, SOLUTION INTRAMUSCULAR at 22:22

## 2019-02-13 RX ADMIN — PHENYLEPHRINE HYDROCHLORIDE 100 MCG: 10 INJECTION, SOLUTION INTRAMUSCULAR; INTRAVENOUS; SUBCUTANEOUS at 09:10

## 2019-02-13 RX ADMIN — SODIUM CHLORIDE, POTASSIUM CHLORIDE, SODIUM LACTATE AND CALCIUM CHLORIDE: 600; 310; 30; 20 INJECTION, SOLUTION INTRAVENOUS at 08:24

## 2019-02-13 RX ADMIN — PHENYLEPHRINE HYDROCHLORIDE 0.5 MCG: 10 INJECTION, SOLUTION INTRAMUSCULAR; INTRAVENOUS; SUBCUTANEOUS at 08:41

## 2019-02-13 RX ADMIN — OXYTOCIN-SODIUM CHLORIDE 0.9% IV SOLN 30 UNIT/500ML 300 ML/HR: 30-0.9/5 SOLUTION at 09:03

## 2019-02-13 RX ADMIN — OXYTOCIN-SODIUM CHLORIDE 0.9% IV SOLN 30 UNIT/500ML 100 ML/HR: 30-0.9/5 SOLUTION at 12:39

## 2019-02-13 RX ADMIN — PHENYLEPHRINE HYDROCHLORIDE 100 MCG: 10 INJECTION, SOLUTION INTRAMUSCULAR; INTRAVENOUS; SUBCUTANEOUS at 09:16

## 2019-02-13 RX ADMIN — KETOROLAC TROMETHAMINE 30 MG: 30 INJECTION, SOLUTION INTRAMUSCULAR at 15:58

## 2019-02-13 RX ADMIN — Medication 5 MG: at 08:48

## 2019-02-13 RX ADMIN — SODIUM CHLORIDE, SODIUM LACTATE, POTASSIUM CHLORIDE, CALCIUM CHLORIDE: 600; 310; 30; 20 INJECTION, SOLUTION INTRAVENOUS at 07:13

## 2019-02-13 RX ADMIN — SENNOSIDES AND DOCUSATE SODIUM 1 TABLET: 8.6; 5 TABLET ORAL at 19:58

## 2019-02-13 RX ADMIN — ACETAMINOPHEN 975 MG: 325 TABLET, FILM COATED ORAL at 11:30

## 2019-02-13 RX ADMIN — FENTANYL CITRATE 10 MCG: 50 INJECTION, SOLUTION INTRAMUSCULAR; INTRAVENOUS at 08:36

## 2019-02-13 RX ADMIN — KETOROLAC TROMETHAMINE 30 MG: 30 INJECTION, SOLUTION INTRAMUSCULAR at 09:25

## 2019-02-13 RX ADMIN — BUPIVACAINE HYDROCHLORIDE IN DEXTROSE 1.6 ML: 7.5 INJECTION, SOLUTION SUBARACHNOID at 08:36

## 2019-02-13 RX ADMIN — BUPIVACAINE 20 ML: 13.3 INJECTION, SUSPENSION, LIPOSOMAL INFILTRATION at 10:25

## 2019-02-13 RX ADMIN — SODIUM CITRATE AND CITRIC ACID MONOHYDRATE 30 ML: 500; 334 SOLUTION ORAL at 07:48

## 2019-02-13 RX ADMIN — MORPHINE SULFATE 0.1 MG: 1 INJECTION, SOLUTION EPIDURAL; INTRATHECAL; INTRAVENOUS at 08:36

## 2019-02-13 RX ADMIN — SIMETHICONE CHEW TAB 80 MG 80 MG: 80 TABLET ORAL at 22:22

## 2019-02-13 RX ADMIN — Medication 10 MG: at 08:45

## 2019-02-13 RX ADMIN — ACETAMINOPHEN 975 MG: 325 TABLET, FILM COATED ORAL at 19:58

## 2019-02-13 RX ADMIN — ONDANSETRON 4 MG: 2 INJECTION INTRAMUSCULAR; INTRAVENOUS at 09:08

## 2019-02-13 RX ADMIN — SODIUM CHLORIDE, POTASSIUM CHLORIDE, SODIUM LACTATE AND CALCIUM CHLORIDE: 600; 310; 30; 20 INJECTION, SOLUTION INTRAVENOUS at 07:13

## 2019-02-13 ASSESSMENT — LIFESTYLE VARIABLES: TOBACCO_USE: 1

## 2019-02-13 NOTE — H&P
ADMIT NOTE  =================  41w5d    Jaqui Santos is a 38 year old female with an Patient's last menstrual period was 2018. and Estimated Date of Delivery: 2019 [facility] at 11:45 PM with admit for observation and either labor induction or  section in the am for near to postdates with high fetal station and previous  section.     HPI  ================  Patient with planned Out of Hospital Birth is transferring by car to the hospital for admit. She is 41w 5d pregnant with her second child. [facility] but her midwife found her baby to be in too high a station to place a peterson safely. She would like to avoid a  section but feels that she has processed that this might be the best option given her circumstance. After a brief stay in triage, she will be admitted for observation overnight and will plan on either labor induction or  section in the am for near to postdates with high fetal station and previous  section. Ob will come to room to discuss plan and to perform BSUS.     [facility].  Transferring midwife name(s),/[facility] & phone number: [phone]  Midwife here supporting patient: Jaycee Boswell  Records received, reviewed and scanned into chart.     Contractions- none  Fetal movement- active  ROM- no   Vaginal bleeding- none  GBS- negative  FOB- is involved, Ever  Other labor support- [facility] midwife will come    Weight gain- 134 - 161 lbs, Total weight  gain- 25 lbs  Height- 65  BMI- 21  First prenatal visit at 11.1 weeks, Total visits- >12    PROBLEM LIST  =================  Patient Active Problem List    Diagnosis Date Noted     Labor and delivery, indication for care 2019     Priority: Medium       HISTORIES  ============  No Known Allergies  Past Medical History:   Diagnosis Date     Cervical vertebral fusion  2017     Chickenpox 1989     Past Surgical History:   Procedure Laterality Date     ABDOMEN SURGERY       TONSILLECTOMY & ADENOIDECTOMY     .  Family History   Problem Relation Age of Onset     Respiratory Maternal Grandmother      Respiratory Maternal Grandfather      Cerebrovascular Disease Paternal Grandfather      Social History     Tobacco Use     Smoking status: Never Smoker     Smokeless tobacco: Never Used   Substance Use Topics     Alcohol use: Yes     Frequency: Never     Comment: 8/week     Obstetric History       T1      L1     SAB1   TAB1   Ectopic0   Multiple0   Live Births1       # Outcome Date GA Lbr Nic/2nd Weight Sex Delivery Anes PTL Lv   4 Current            3 Term 11/05/15 42w3d  4.026 kg (8 lb 14 oz) M -SEC EPI N KARRIE      Name: Bala      Complications: Fetal Intolerance   2 SAB            1 TAB                  LABS:   ===========  Prenatal Labs reviewed per transfer records:   Rhogam not indicated  ABO/ RH: 0+  Rubella immune   HBsAg: negative   HIV: negative   RPR: NR   GCT: date 2018, result 71  GBS: date 2019, result negative  OTHER: no       Lab Results   Component Value Date    ABO O 2018    RH Pos 2018    AS negative 2018    HEPBANG non-reactive 2018     Lab Results   Component Value Date    GBS negative 2019     Other labs:  No results found for this or any previous visit (from the past 24 hour(s)).    ULTRASOUND  =============  Date 2019, result anterior placenta, MIKAEL 13.9, BPP 8/8 vertex  Date 2018 21w1d normal MIKAEL, EFW 36%    ROS  =========  Pt denies significant respiratory, cardiovacular, GI, or muscular/skeletalcomplaints.    See RN data base ROSDino Godinez calculator on admit 32%    PHYSICAL EXAM:  ===============  /69   Temp 98.8  F (37.1  C) (Oral)   Resp 16   LMP 2018   General appearance: comfortable  GENERAL APPEARANCE: healthy, alert and no distress  RESP: lungs clear to  auscultation - no rales, rhonchi or wheezes  BREAST: normal without masses, tenderness or nipple discharge and no palpable axillary masses or adenopathy  CV: regular rates and rhythm, normal S1 S2, no S3 or S4 and no murmur,and no varicosities  ABDOMEN:  soft, nontender, no epigastric pain  SKIN: no suspicious lesions or rashes  NEURO: Denies headache, blurred vision, other vision changes  PSYCH: mentation appears normal. and affect normal/bright  Legs: Reflexes normal bilaterally     Abdomen: gravid, vertex fetus per Leopold's, non-tender between contractions.   Cephalic presentation confirmed by BSUS  EFW-  8 lbs.   CONTACTIONS: none  FETAL HEART TONES: continuous EFM- baseline 130 with moderate variability and positive accelerations. No decelerations.  PELVIC EXAM: 1/ long/ Posterior/ firm/ FLOATING   MARTIN SCORE: 1  BLOODY SHOW: no   ROM:no  FLUID: none  ROMPLUS: not done    # Pain Assessment:  Current Pain Score 2019   Patient currently in pain? denies   Jaqui green pain level was assessed and she currently denies pain.        ASSESSMENT:  ==============  IUP @ 41w5d admitted for observation, consents to  in the am.   NST REACTIVE  Fetal Heart Rate - category two  GBS- negative     PLAN:  ===========  Admit - see IP orders  Will do extended monitoring this evening.  OB MD will come to perform more extensive BSUS.  Will continue to discuss plan for delivery. Discussion regarding limited options for IOL due to high fetal station, near post date status (high potential for placental insufficiency) and past .  Consent signed.  IV and labs in the am.  MD on call French consulted and agrees with plan.  ARIEL to MDs.       Maritza Brunner CNM

## 2019-02-13 NOTE — ANESTHESIA PROCEDURE NOTES
Peripheral Nerve Block Procedure Note    Staff:     Anesthesiologist:  Deepa Titus MD    Resident/CRNA:  Noel Wiley MD    Block performed by resident/CRNA in the presence of a teaching physician    Location: PACU  Procedure Start/Stop TImes:      2/13/2019 10:20 AM     2/13/2019 10:30 AM    patient identified, IV checked, site marked, risks and benefits discussed, informed consent, monitors and equipment checked, pre-op evaluation, at physician/surgeon's request and post-op pain management      Correct Patient: Yes      Correct Position: Yes      Correct Site: Yes      Correct Procedure: Yes      Correct Laterality:  Yes    Site Marked:  Yes  Procedure details:     Procedure:  TAP    ASA:  2    Laterality:  Bilateral    Position:  Supine    Sterile Prep: chloraprep      Local skin infiltration:  None    Needle:  Short bevel    Needle gauge:  17    Needle length (inches):  3.13    Ultrasound: Yes      Ultrasound used to identify targeted nerve, plexus, or vascular structure and placed a needle adjacent to it      Permanent Image entered into patiient's record      Abnormal pain on injection: No      Blood Aspirated: No      Paresthesias:  No    Bleeding at site: No      Infusion Method:  Single Shot    Blood aspirated via catheter: No      Complications:  None  Assessment/Narrative:      Noel Wiley  Anesthesiology Resident, PGY-2   [facility]   [phone]  2/13/2019 11:22 AM

## 2019-02-13 NOTE — ANESTHESIA CARE TRANSFER NOTE
Patient: Jaqui Santos    Procedure(s):   SECTION    Diagnosis: Pregnancy  Diagnosis Additional Information: No value filed.    Anesthesia Type:   Spinal, Peripheral Nerve Block for post-op pain at surgeon's request     Note:  Airway :Room Air  Patient transferred to:PACU  Comments: Patient transferred to OB PACU.  Asymptomatic, VSS on arrival to PACU.  Report given to PACU RN.     Noel Wiley  Anesthesiology Resident, PGY-2   [facility]   [phone]  2019 11:23 AM  Handoff Report: Identifed the Patient, Identified the Reponsible Provider, Reviewed the pertinent medical history, Discussed the surgical course, Reviewed Intra-OP anesthesia mangement and issues during anesthesia, Set expectations for post-procedure period and Allowed opportunity for questions and acknowledgement of understanding      Vitals: (Last set prior to Anesthesia Care Transfer)    CRNA VITALS  2019 0907 - 2019 1007      2019             Pulse:  69    Ht Rate:  64    SpO2:  97 %                Electronically Signed By: Noel Wiley MD  2019  11:22 AM

## 2019-02-13 NOTE — PLAN OF CARE
Data: Patient presented to triage at , admitted to room 473 at 2125. Patient is a . Prenatal record reviewed.   Obstetric History       T1      L1     SAB1   TAB1   Ectopic0   Multiple0   Live Births1       # Outcome Date GA Lbr Nic/2nd Weight Sex Delivery Anes PTL Lv   4 Current            3 Term 11/05/15 42w3d  4.026 kg (8 lb 14 oz) M -SEC EPI N KARRIE      Name: Bala      Complications: Fetal Intolerance   2 SAB            1 TAB               .  Medical History:   Past Medical History:   Diagnosis Date    Cervical vertebral fusion 2017    Chickenpox 1989   .  Gestational age 41w5d. Vital signs per doc flowsheet. Fetal movement present. Patient reports Rule Out Labor (TOLAC, [facility] from [facility])   as reason for admission. Support persons Ever present.  Action: RN obtained at . Care of patient assumed at . Verbal consent for EFM, external fetal monitors applied. Admission assessment completed. Patient and support persons educated on labor process. Patient instructed to report change in fetal movement, contractions, vaginal leaking of fluid or bleeding, abdominal pain, or any concerns related to the pregnancy to her nurse/physician. Patient oriented to room, call light in reach.   Response: Dedra Brunner CNM informed of arrival, at bedside for assessment; Dr. Romo at bedside for ultrasound and consult for labor plan. Plan per provider is recommended  section, questions answered. Patient verbalized understanding of education and verbalized desire to discuss plan with Ever.

## 2019-02-13 NOTE — PROGRESS NOTES
Consult Note: Obstetrics  2019    HPI:  Jaqui Santos is a 38 year old  who was a ARIEL from [facility]. She has a history of c/s x1 postdates due to Cat II RFD. She desired a TOLAC with this pregnancy, however today she is 41w6d and has not yet gone into spontaneous labor. She was admitted to [facility] for induction with peterson catheter, however on arrival the midwife was concerned about high fetal station and recommended transfer to [facility]. She therefore was admitted to the [facility] service. OB was consulted due to concern for possible malpresentation vs safety of induction with cook catheter.     She denies any leaking of fluid, vaginal bleeding or contractions. She is noting fetal movement. She otherwise is doing well, just frustrated that she has not gone into labor and very resistant to c/s at this time.     Medical history  Denies any outside of pregnancy    Surgical history  C/s x1 due to cat II RFD. Pt made it to 7cm but then had nonreassuring tracing, over the course of 2.5hr made no cervical change and c/s was recommended. Operative note describes nuchal x2 and no trouble with fetal extraction    Social history  Lives at home with  and son Bala. Denies smoking, alcohol and drug use.    Allergies  No know drug allergies, though hesitant to take abx due to yeast infection previously    Objective:  Vitals:    19 0150   BP: 114/69 92/51   Resp: 16 16   Temp: 98.8  F (37.1  C) 97.9  F (36.6  C)   TempSrc: Oral Oral     Gen: well-appearing, gravid, no acute distress  Card: regular rate and rhythm  Pulm: nonlabored breathing  Abd: soft, nontender, nondistended, gravid. EFW 8lb  LE: trace edema bilaterally    US: cephalic by bedside US    FHT: 130bpm, moderate variability, no accels or decels  [place]: quiet    Assessment:   Jaqui Santos is a 38 year old  at 41w6d here as a transfer of care from [facility] for concern regarding fetal  presentation and safety of postdates induction for TOLAC. Patient is otherwise healthy    Plan:   Had extensive discussion with Mary about safety of TOLAC in the setting of high fetal station and previous c/s. Discussed that there is no way to determine via US whether there is a cause for fetal high station. Because she has not gone into spontaneous labor the only way to offer induction in setting of previous c/s would be with cook, however due to high station there is increased risk for cord prolapse if ruptured and possible change of presentation if unstable lie. Discussed that would not offer medical cervical ripening due to increased risk of uterine rupture, and with nonfavorable cervix would not offer pitocin at this time. Because she is postdates, would recommend  section. After approximately 1hr of discussion patient acknowledges that c/s is probably what needs to happen, just very disappointed that she was not able to labor and very much not looking forward to shorter recovery period. Discussed that likely her recovery will be better than last c/s as she has not labored for 4 days prior to c/s.     Described scenario of c/s to her and her  and she is agreeable. The risks, benefits, and alternatives of  section were discussed, including the risks of bleeding, infection, injury to surrounding organs, fetal injury, and remote risks of hysterectomy. She consented to a blood transfusion in the event of a life threatening amount of bleeding. Surgical consent was signed.    She would like to keep her placenta for encapsulation. Discussed this is not recommended but that she can keep placenta if she desires. Will discuss with day team.     - NPO at 0100, no need for IVF  - Labs in AM, Type and Screen, CBC, and RPR  - Orders in, scheduled for first start c/s at 0830    Patient seen and discussed with Dr David Romo MD  Obstetrics and Gynecology, PGY-2  2019, 3:40  AM     The patient was seen with Dr. Romo.  I agree with the above assessment and plan of care.    Jaycee Hernandez MD, FACOG

## 2019-02-13 NOTE — PLAN OF CARE
[id number]:  Oriented to room and unit routines and welcome folder.  VSS and postpartum assessments WDL.  Bonding well with infant.  Breastfeeding on cue with occasional assist with getting a deeper latch and flanging infant's bottom lip.  Pain managed with tylenol and toradol per MAR as well as hot packs.  Incisional dressing clean, dry, intact.  PIV infusing 2nd bag of picosin.  Franks catheter collecting adequate amounts of urine.  , Ever present and supportive.  Will continue with postpartum cares and education per plan of care.

## 2019-02-13 NOTE — DISCHARGE SUMMARY
[facility] Discharge Summary    Jaqui Santos MRN# [id number]   Age: 38 year old YOB: 1980     Date of Admission:  2019  Date of Discharge:  2/15/2019  Admitting Physician:  Abigail Adan MD  Discharge Physician:  Lizette Zavaleta MD    Admit Dx:   -  at 41w6d   - H/o c/s x1 for category II FHT RFD    Discharge Dx:  - Same as above, s/p repeat low transverse  section      Procedures:  - repeat low transverse  section with double layer uterine closure via Pfannenstiel incision  - Spinal analgesia  - TAP block    Admit HPI:  Jaqui Santos is a 38 year old  who is a transfer of care from the [facility] service.     Patient with planned Out of Hospital Birth is transferring by car to the hospital for admit. She is 41w 5d pregnant with her second child. She was planning on an IOL at [facility] but her midwife found her baby to be in too high a station to place a peterson safely. She would like to avoid a  section but feels that she has processed that this might be the best option given her circumstance. After a brief stay in triage, she will be admitted for observation overnight and will plan on either labor induction or  section in the am for near to postdates with high fetal station and previous  section. Ob will come to room to discuss plan and to perform BSUS.     Please see her admit H&P for full details of her PMH, PSH, Meds, Allergies and exam on admit.    Operative Course:  Surgery was uncomplicated. EBL from the delivery was 1146 ml. Please see her  Section Operative Note for full details regarding her delivery.    Operative Findings:   1. Single, viable male infant at 0901 hours on 2018. Apgars pending.  Birth weight: pending.  Fetal presentation: HARRIS. Amniotic fluid: thick meconium.    2. Arterial Cord pH 7.09 with base excess 13.7.    3. Placenta intact with 3 vessel cord.     4.  Normal appearing uterus, fallopian tubes, ovaries.   5. No intraabdominal adhesions.  Dense rectofascial adhesions     Postoperative Course:  Her postoperative course was uncomplicated. On POD#3, she was meeting all of her postpartum goals and deemed stable for discharge. She was voiding without difficulty, tolerating a regular diet without nausea and vomiting, her pain was well controlled on oral pain medicines and her lochia was appropriate. Her hemoglobin prior to delivery was 12.5 and after delivery was 11.4. Her Rh status was positive and Rhogam was not indicated.    Discharge Medications:     Review of your medicines      START taking      Dose / Directions   acetaminophen 325 MG tablet  Commonly known as:  TYLENOL      Dose:  650 mg  Take 2 tablets (650 mg) by mouth every 4 hours as needed for other (multimodal surgical pain management along with NSAIDS and opioid medication as indicated based on pain control and physical function.)  Quantity:  60 tablet  Refills:  0     oxyCODONE 5 MG tablet  Commonly known as:  ROXICODONE      Dose:  5 mg  Take 1 tablet (5 mg) by mouth every 6 hours as needed for moderate to severe pain or severe pain  Quantity:  12 tablet  Refills:  0     senna-docusate 8.6-50 MG tablet  Commonly known as:  SENOKOT-S/PERICOLACE      Dose:  2 tablet  Take 2 tablets by mouth 2 times daily as needed for constipation  Quantity:  60 tablet  Refills:  0        CONTINUE these medicines which may have CHANGED, or have new prescriptions. If we are uncertain of the size of tablets/capsules you have at home, strength may be listed as something that might have changed.      Dose / Directions   * IBUPROFEN PO  This may have changed:  Another medication with the same name was added. Make sure you understand how and when to take each.      Take  by mouth.  Refills:  0     * ibuprofen 800 MG tablet  Commonly known as:  ADVIL/MOTRIN  This may have changed:  You were already taking a medication with the  same name, and this prescription was added. Make sure you understand how and when to take each.      Dose:  800 mg  Take 1 tablet (800 mg) by mouth every 6 hours as needed for other (cramping)  Quantity:  60 tablet  Refills:  0         * This list has 2 medication(s) that are the same as other medications prescribed for you. Read the directions carefully, and ask your doctor or other care provider to review them with you.            CONTINUE these medicines which have NOT CHANGED      Dose / Directions   FISH OIL PO      Take  by mouth.  Refills:  0     prenatal multivitamin w/iron 27-0.8 MG tablet      Dose:  1 tablet  Take 1 tablet by mouth daily  Refills:  0           Where to get your medicines      These medications were sent to [facility] [place], [place] - [address]  [address] [address]    Phone:  [phone]     acetaminophen 325 MG tablet    ibuprofen 800 MG tablet    senna-docusate 8.6-50 MG tablet     Some of these will need a paper prescription and others can be bought over the counter. Ask your nurse if you have questions.    Bring a paper prescription for each of these medications    oxyCODONE 5 MG tablet       Discharge/Disposition:  Jaqui Santos was discharged to home in stable condition with the following instructions/medications:  1) Call for temperature > 100.4, bright red vaginal bleeding >1 pad an hour x 2 hours, foul smelling vaginal discharge, pain not controlled by usual oral pain meds, persistent nausea and vomiting not controlled on medications, drainage or redness from incision site  2) She desired Mirena IUD for contraception.  3) For feeding she decided to breastfeed.  4) She was instructed to follow-up with her primary OB in 6 weeks for a routine postpartum visit.  5) Discharge activity:  No heavy lifting >15 lbs or strenuous activity for 6 weeks, pelvic rest for 6 weeks, no driving or operating machinery while on  narcotics.    Radha Burkett MD PhD  Ob/Gyn PGY-3  2/16/2019 8:10 AM    Appreciate Dr. Burkett's summary above, patient also seen and examined by me on the day of discharge. I agree with the summary above.   Lizette Zavaleta MD

## 2019-02-13 NOTE — PROGRESS NOTES
S: ready for c/s today. Concerns about why she can't go in to labor.     O: BP 94/54   Temp 98.4  F (36.9  C) (Oral)   Resp 16   LMP 2018   AA nad  Gravid  FHT: cat 2 tracing at 530AM.  Improved to Cat 1 afterwards  [place]: rare ctx.     A/p: 37 yo  at 41+6 wks, for RC/S.     1. Consent reviewed  2. NPO since [place]  3. Anesthesia team w/ Dr. Titus has seen pt.     Abigail Adan MD, FACOG  [facility] Staff  OB/GYN    2019  8:20 AM

## 2019-02-13 NOTE — ANESTHESIA PREPROCEDURE EVALUATION
Anesthesia Pre-Procedure Evaluation    Patient: Jaqui Santos   MRN:     [id number] Gender:   female   Age:    38 year old :      1980        Preoperative Diagnosis: Pregnancy   Procedure(s):   SECTION     Past Medical History:   Diagnosis Date     Cervical vertebral fusion 2017     Chickenpox 1989      Past Surgical History:   Procedure Laterality Date     ABDOMEN SURGERY       TONSILLECTOMY & ADENOIDECTOMY            Anesthesia Evaluation     . Pt has had prior anesthetic. Type of anesthetic: slw awakening after T&A when 15 yo.    History of anesthetic complications          ROS/MED HX    ENT/Pulmonary:     (+)tobacco use, Past use , . .    Neurologic:  - neg neurologic ROS     Cardiovascular:  - neg cardiovascular ROS       METS/Exercise Tolerance:     Hematologic:  - neg hematologic  ROS       Musculoskeletal:  - neg musculoskeletal ROS       GI/Hepatic:  - neg GI/hepatic ROS       Renal/Genitourinary:  - ROS Renal section negative       Endo:  - neg endo ROS       Psychiatric:  - neg psychiatric ROS       Infectious Disease:  - neg infectious disease ROS       Malignancy:      - no malignancy   Other:    (+) Possibly pregnant                    JZG FV AN PHYSICAL EXAM    No results found for: WBC, HGB, HCT, PLT, CRP, SED, NA, POTASSIUM, CHLORIDE, CO2, BUN, CR, GLC, AAMIR, PHOS, MAG, ALBUMIN, PROTTOTAL, ALT, AST, GGT, ALKPHOS, BILITOTAL, BILIDIRECT, LIPASE, AMYLASE, ANGELA, PTT, INR, FIBR, TSH, T4, T3, HCG, HCGS, CKTOTAL, CKMB, TROPN    Preop Vitals  BP Readings from Last 3 Encounters:   19 94/54    Pulse Readings from Last 3 Encounters:   No data found for Pulse      Resp Readings from Last 3 Encounters:   19 16    SpO2 Readings from Last 3 Encounters:   No data found for SpO2      Temp Readings from Last 1 Encounters:   19 36.9  C (98.4  F) (Oral)    Ht Readings from Last 1 Encounters:   No data found for Ht      Wt Readings from Last 1 Encounters:   No data  found for Wt    There is no height or weight on file to calculate BMI.     LDA:            Assessment:   ASA SCORE: 2       Documentation: H&P complete; Preop Testing complete; Consents complete   Proceeding: Proceed without further delay     Plan:   Anes. Type:  Regional     RA Details:  Labor/OB Procedure; SS; Exparel     RA-Location/Type: Spinal   Pre-Induction: None     Drips/Meds-Preparation: Oxytocin   Induction:  Not applicable   Airway: Native Airway   Access/Monitoring: PIV   Maintenance: N/a   Emergence: Not Applicable   Logistics: Observation/Admission     Postop Pain/Sedation Strategy:  Standard-Options: Block SS     PONV Management:  Adult Risk Factors: Female  Prevention: Ondansetron     CONSENT: Direct conversation   Plan and risks discussed with: Patient                            Noel Wiley MD

## 2019-02-13 NOTE — ANESTHESIA PROCEDURE NOTES
Spinal/LP Procedure Note    Spinal Block  Staff:     Anesthesiologist:  Deepa Titus MD    Resident/CRNA:  Noel Wiley MD    Spinal/LP performed by resident/CRNA in presence of a teaching physician.    Location: OB and OR  Procedure Start/Stop Times:      2/13/2019 8:33 AM    patient identified, IV checked, site marked, risks and benefits discussed, informed consent, monitors and equipment checked, pre-op evaluation, at physician/surgeon's request and post-op pain management      Correct Patient: Yes      Correct Position: Yes      Correct Site: Yes      Correct Procedure: Yes      Correct Laterality:  Yes    Site Marked:  Yes  Procedure:     Procedure:  Intrathecal    ASA:  2    Position:  Sitting    Sterile Prep: chloraprep      Insertion site:  L3-4    Approach:  Midline    Needle Type:  Edel    Needle gauge (G):  27    Local Skin Infiltration:  2% lidocaine    amount (ml):  3    Needle Length (in):  3.5    Introducer used: Yes      Introducer gauge:  20 G    Attempts:  1    Redirects:  1    CSF:  Clear    Paresthesias:  No  Assessment/Narrative:      Noel Wiley  Anesthesiology Resident, PGY-2   [facility]   [phone]  2/13/2019 8:59 AM

## 2019-02-13 NOTE — PLAN OF CARE
Pt at 41.6 weeks with history of prior C/Section, not laboring and cervix unfavorable. Pt has consented for scheduled 0830 C/Section.

## 2019-02-13 NOTE — ANESTHESIA POSTPROCEDURE EVALUATION
Anesthesia POST Procedure Evaluation    Patient: Jaqui Santos   MRN:     [id number] Gender:   female   Age:    38 year old :      1980        Preoperative Diagnosis: Pregnancy   Procedure(s):   SECTION   Postop Comments: No value filed.       Anesthesia Type:  Regional    Reportable Event: NO     PAIN: Uncomplicated   Sign Out status: Comfortable, Well controlled pain     PONV: No PONV   Sign Out status:  No Nausea or Vomiting     Neuro/Psych: Uneventful perioperative course   Sign Out Status: Preoperative baseline; Age appropriate mentation     Airway/Resp.: Uneventful perioperative course   Sign Out Status: Non labored breathing, age appropriate RR; Resp. Status within EXPECTED Parameters     CV: Uneventful perioperative course   Sign Out status: Appropriate BP and perfusion indices; Appropriate HR/Rhythm     Disposition:   Sign Out in:  PACU  Disposition:  Phase II; Home  Recovery Course: Uneventful  Follow-Up: Not required           Last Anesthesia Record Vitals:  CRNA VITALS  2019 0907 - 2019 1007      2019             Pulse:  69    Ht Rate:  64    SpO2:  97 %          Last PACU/Preop Vitals:  Vitals:    19 1045 19 1100 19 1115   BP: 105/66 113/79 114/68   Pulse:      Resp: 18 18 10   Temp:      SpO2: 100% 100% 100%         Electronically Signed By: Deepa Titus MD, 2019, 11:28 AM

## 2019-02-13 NOTE — PLAN OF CARE
"VSS and afebrile throughout shift. Patient comfortable and feels occasional tightening. Patient reports not sleeping well due to increased fetal movement throughout night. Reports feeling disappointed about \"having a .\" Empathetic listening utilized. Patient showered and prepped for am c section.  "

## 2019-02-13 NOTE — PLAN OF CARE
Patient in room 477 for scheduled C/S. VS stable. Positive fetal movement. Doptones performed. PIV placed. Procedure explained to patient. Questions answered. Consents signed. Patient prepped for surgery.

## 2019-02-13 NOTE — ANESTHESIA PROCEDURE NOTES
Peripheral Nerve Block Procedure Note    Staff:     Anesthesiologist:  Deepa Titus MD    Resident/CRNA:  Noel Wiley MD    Block performed by resident/CRNA in the presence of a teaching physician    Location: PACU  Procedure Start/Stop TImes:     patient identified, IV checked, site marked, risks and benefits discussed, informed consent, monitors and equipment checked, pre-op evaluation, at physician/surgeon's request and post-op pain management      Correct Patient: Yes      Correct Position: Yes      Correct Site: Yes      Correct Procedure: Yes      Correct Laterality:  N/A    Site Marked:  N/A  Procedure details:     Procedure:  TAP    ASA:  2    Laterality:  Bilateral    Position:  Supine    Sterile Prep: chloraprep, mask and sterile gloves      Insertion Site:  T11-12    Needle:  Insulated    Needle gauge:  17    Needle length (inches):  3.1    Catheter threaded easily: Yes      Ultrasound: Yes      Ultrasound used to identify targeted nerve, plexus, or vascular structure and placed a needle adjacent to it      Permanent Image entered into patiient's record      Abnormal pain on injection: No      Blood Aspirated: No      Paresthesias:  No    Test dose negative for signs of intravascular injection: Yes      Infusion Method:  Single Shot    Blood aspirated via catheter: No      Complications:  None  Assessment/Narrative:     Injection made incrementally with aspirations every (mL):  3     A total of 20 cc Bupivacaine 0.25% and 20 ml of Exparel were injected bilaterally.

## 2019-02-13 NOTE — PLAN OF CARE
Patient to OR via ambulatory. Delivered C/S by Dr. Adan. Baby delivered, cord clamping delayed 60 seconds and brought to prewarmed infant warmer. Infant stimulated and dried. Apgars 9/9. Brought to mother for skin to skin bonding.

## 2019-02-13 NOTE — BRIEF OP NOTE
[facility]  Brief Operative Note    Date of Surgery: 19     Preop Dx:    -  at 41w6d   - H/o c/s x1 for category II FHT RFD    Postop Dx:     - , now delivered    Procedure: Repeat low transverse  section with two layer closure via pfannenstiel skin incision    Surgeon:  Abigail Adan MD    Assistants: Carolynn Bowers MD PGY-2        Anesthesia: Spinal anesthesia    QBL:  1146 cc    IVF:  1000 cc    UOP:  40 cc    Drains:  Franks catheter    Specimen: Cord blood    Complications: None apparent     Findings:   1. Single, viable male infant at 0901 hours on 2018. Apgars pending.  Birth weight: pending.  Fetal presentation: HARRIS. Amniotic fluid: thick meconium.    2. Arterial Cord pH 7.09 with base excess 13.7.    3. Placenta intact with 3 vessel cord.     4. Normal appearing uterus, fallopian tubes, ovaries.   5. No intraabdominal adhesions.  Dense rectofascial adhesions     Disposition: Transferred in stable condition to the PACU    Carolynn Bowers MD  Ob/Gyn PGY-2  19 8:21 AM

## 2019-02-13 NOTE — PLAN OF CARE
Patient arrived to [facility] unit prior to this writer's arrival with belongings, accompanied by spouse/ significant other, with infant in arms and watched over by L&D RN Denita LUNA Received report from Denita LUNA and checked bands at 1220. Unit and room orientation started. Call light given; no concerns present at this time. Continue with plan of care.

## 2019-02-13 NOTE — PROGRESS NOTES
[facility] Labor and Delivery Progress Note    Jaqui Santos MRN# [id number]   Age: 38 year old YOB: 1980         Subjective:   Mary and Ever are amenable to plan for  section in the am. They plan to eat and then try and sleep and then  Will remain NPO at least 6 hours before scheduled  section at 0830.           Objective:     Patient Vitals for the past 24 hrs:   BP Temp Temp src Resp   19 114/69 98.8  F (37.1  C) Oral 16      Cervical Exam: deferred    Membranes: Intact     Fetal Heart Rate:    Monitor: external US    Variability: moderate (amplitude range 6 to 25 bpm)    Baseline Rate: normal range    Fetal Heart Rate Tracing: cat two    Contractions:  None        Assessment:   Jaqui Santos is a 38 year old  who is 41w6d here with observation and plan for  section in the am.        Plan:   -Extended monitoring done in room. Ok to now be off of monitor.  -Extensive conversation with Dr. Romo about having few options for moving towards delivery (no peterson due to high station, likelihood of pitocin causing fetal stress giving category II tracing in labor and no cervical ripening due to previous c/section) and the strong recommendation to plan on a scheduled c/section in the am. Risks vs benefits of attempting an induction with the strong possibility of a repeat  section after laboring vs a scheduled c/section was reviewed. At this time the couple is ready to consent to a repeat c/section.   -Mary submits that she is disappointed about needing to make this decision but understands that this may be the best decision given there are few paths forward for inducing labor.  -Mary's feelings about choosing a repeat c/section were validated by Dr. Romo and this writer, and Mary is assured that this is not her fault, rather a combination of the variables presenting.  -ARIEL to OB MDs.     Maritza Brunner CNM

## 2019-02-13 NOTE — PLAN OF CARE
Data: Jaqui Santos transferred to 7137 via cart at 1150. Baby transferred via parent's arms.  Action: Receiving unit notified of transfer: Yes. Patient and family notified of room change. Report given to Lizzette ARMSTRONG at 1220. Belongings sent to receiving unit. Accompanied by Registered Nurse. Oriented patient to surroundings. Call light within reach. ID bands double-checked with receiving RN.  Response: Patient tolerated transfer and is stable.

## 2019-02-14 LAB — HGB BLD-MCNC: 11.4 G/DL (ref 11.7–15.7)

## 2019-02-14 PROCEDURE — 25000132 ZZH RX MED GY IP 250 OP 250 PS 637: Performed by: STUDENT IN AN ORGANIZED HEALTH CARE EDUCATION/TRAINING PROGRAM

## 2019-02-14 PROCEDURE — 12000001 ZZH R&B MED SURG/OB UMMC

## 2019-02-14 PROCEDURE — 36415 COLL VENOUS BLD VENIPUNCTURE: CPT | Performed by: STUDENT IN AN ORGANIZED HEALTH CARE EDUCATION/TRAINING PROGRAM

## 2019-02-14 PROCEDURE — 85018 HEMOGLOBIN: CPT | Performed by: STUDENT IN AN ORGANIZED HEALTH CARE EDUCATION/TRAINING PROGRAM

## 2019-02-14 PROCEDURE — 25000128 H RX IP 250 OP 636: Performed by: STUDENT IN AN ORGANIZED HEALTH CARE EDUCATION/TRAINING PROGRAM

## 2019-02-14 RX ADMIN — SENNOSIDES AND DOCUSATE SODIUM 1 TABLET: 8.6; 5 TABLET ORAL at 21:10

## 2019-02-14 RX ADMIN — SIMETHICONE CHEW TAB 80 MG 80 MG: 80 TABLET ORAL at 04:25

## 2019-02-14 RX ADMIN — ACETAMINOPHEN 975 MG: 325 TABLET, FILM COATED ORAL at 04:25

## 2019-02-14 RX ADMIN — SENNOSIDES AND DOCUSATE SODIUM 1 TABLET: 8.6; 5 TABLET ORAL at 08:43

## 2019-02-14 RX ADMIN — IBUPROFEN 800 MG: 800 TABLET, FILM COATED ORAL at 16:58

## 2019-02-14 RX ADMIN — KETOROLAC TROMETHAMINE 30 MG: 30 INJECTION, SOLUTION INTRAMUSCULAR at 04:25

## 2019-02-14 RX ADMIN — ACETAMINOPHEN 975 MG: 325 TABLET, FILM COATED ORAL at 21:10

## 2019-02-14 RX ADMIN — ACETAMINOPHEN 975 MG: 325 TABLET, FILM COATED ORAL at 13:02

## 2019-02-14 RX ADMIN — SIMETHICONE CHEW TAB 80 MG 80 MG: 80 TABLET ORAL at 21:10

## 2019-02-14 RX ADMIN — IBUPROFEN 800 MG: 800 TABLET, FILM COATED ORAL at 10:56

## 2019-02-14 RX ADMIN — SIMETHICONE CHEW TAB 80 MG 80 MG: 80 TABLET ORAL at 12:58

## 2019-02-14 NOTE — PLAN OF CARE
"Data: Vital signs within normal limits. Postpartum checks within normal limits - see flow record. Patient eating and drinking normally. Patient does not want peterson to be removed until she \"starts her day in the morning\" (she is aware it needs to be removed before 0900). Patient performing self cares and is able to care for infant.  Action: Patient medicated during the shift for overall soreness, but in general she denies pain.   Response: Positive attachment behaviors observed with infant.   Will continue to monitor and provide support.   "

## 2019-02-14 NOTE — PLAN OF CARE
Patient has voided twice post peterson removal, per patient report. Ambulating in room and beverly. Reporting adequate pain control with tylenol and ibuprofen. Independently breastfeeding infant. Reports some tenderness and pinching, is uncertain if it is the normal tenderness of breastfeeding or due to infants tight frenulum. Lactation consult has been placed.

## 2019-02-14 NOTE — PLAN OF CARE
VSS and postpartum assessments WDL.  Up ad lisandro with steady gait.  Independent with cares.  Bonding well with infant.  Breastfeeding on cue independently, pt calls for occasional assist with flanging infant's bottom lip and getting a deeper latch.  Pain managed with tylenol, toradol, and simethicone per MAR.  Incisional dressing clean, dry, and intact.  Franks catheter collecting large amounts of clear urine.  , Ever present and supportive.  Will continue with postpartum cares and education per plan of care.

## 2019-02-14 NOTE — PROGRESS NOTES
Post Partum Progress Note  PPD#1    Subjective:  Mary is doing well this morning, snuggling with son. She notes that her c/s was much better than previous c/s and had a wonderful nurse in PACU, which was very helpful. She is tolerating PO without n/v. She ambulated 3 times yesterday. She still has peterson in place and requesting it stays in until 8AM. She is passing flatus, no BM yet. Denies headache, lightheadedness and dizziness.    Planning to breast feed, latch going well. Thinks she will do a mirena for contraception  Objective:  Vitals:    19 1545 19 2000 19 0100 19 0430   BP: 98/59 98/59 (!) 85/53 98/56   Pulse:       Resp: 16 16 16 16   Temp: 97.9  F (36.6  C) 97.6  F (36.4  C) 98  F (36.7  C) 98.2  F (36.8  C)   TempSrc: Oral Oral Oral Oral   SpO2: 99% 100% 100% 100%       General: NAD, resting comfortably  CV: Regular rate, well perfused.   Pulm: Normal respiratory effort.  Abd: Soft, non-tender, non-distended. Fundus is firm and at the umbilicus, right-deviated  Incision: dressing in place is clean and dry  Ext: no lower extremity edema bilaterally. No calf tenderness.    Assessment/Plan:  Jaqui Santos is a 38 year old  female who is POD#1 s/p RLTCS.    - Encourage routine post-operative goals including ambulation and incentive spirometry  - PNC: Rh positive. Rubella unknown - no records Will discuss next time we talk. No intervention indicated.  - Pain: controlled on oral medications  - Heme: Hgb 12.5>EBL 1174>AM Hgb pending.  If <10 will discharge home with iron.  - GI: continue anti-emetics and stool softeners as needed.  - : Peterson in place, f/u spontaneous void after peterson removed this morning.  - Infant: Stable in room  - Feeding: Plans on breastfeeding.  - BC: Plans on Mirena postpartum    Discharge to home on POD#2-3, when meeting postoperative goals    Alyssa Romo MD  Obstetrics and Gyncology, PGY-2  2019 , 6:45 AM     Appreciate note by  Dr. Romo. Patient has been seen and examined by me separate from the resident, agree with above note.   Hemoglobin   Date Value Ref Range Status   02/14/2019 11.4 (L) 11.7 - 15.7 g/dL Final       Brenna Nichols MD  11:39 AM

## 2019-02-14 NOTE — PROGRESS NOTES
Jaqui Santos      MRN#: [id number]  Age: 38 year old      YOB: 1980      CNM Social Rounds    Pt noted laying in bed breastfeeding baby without issue.  Partner is at bedside, supportive. State they've had a very busy day of check ups but overall doing well.    Reviewed CNM social rounds/involvement postpartum. Pt and partner verbalized understanding of CNM social role.   Pt and partner have no unidentified unmet needs at this time.  Calli FRANK, CNM

## 2019-02-14 NOTE — LACTATION NOTE
This note was copied from a baby's chart.  Consult for: Parents with questions about ankyloglossia    Delivery Information: Infant born at 41.6 weeks via uncomplicated vaginal delivery.     Maternal Health History: Jaqui is AMA, but is otherwise healthy. She  her 3 year old son until he was almost 3 and had no concerns about latch or milk supply. ?    Maternal Breast Exam: Jaqui was breastfeeding during early pregnancy. Her breasts are symmetrical and rounded with bilateral intact, everted nipples. She is able to get a comfortable latch. ?    Infant information: Infant has been waking to breastfeed frequently and has age appropriate output. His pediatrician noted ankyloglossia on her exam this morning and reviewed frenotomy with family.    He has a shortened lingual frenulum with a more posterior attachment. When he sucks he is able to get his tongue just over his lower gumline, but has a very floppy suck and appears to break suction frequently with the back of his tongue.     Feeding Assessment: Jaqui has been independently breastfeeding and declines assistance or assessment at this time. She has questions about frenotomy.    Education: breastfeeding positions,role of the tongue in breastfeeding, signs breastfeeding is going well (comfortable latch, age appropriate output and weight loss, swallowing heard at the breast),  weight loss benefits of skin to skin, the Second Night, benefits of breast massage and hand expression of colostrum, frenotomy procedure, inpatient lactation support and outpatient lactation resources.      Plan: Continue breastfeeding on cue with RN support as needed with a goal of 8-12 feedings per day. Encourage frequent skin to skin, breast massage and hand expression.   Parents will review ankyloglossia and frenotomy with pediatrician tomorrow.

## 2019-02-14 NOTE — PROGRESS NOTES
Post  Anesthesia Follow Up Note    Patient: Jaqui Santos    Patient location: Postpartum floor.    Chief complaint: Acute postoperative pain management s/p intrathecal morphine administration     Procedure(s) Performed:  Procedure(s):   SECTION    Anesthesia type: Spinal Block    Subjective:     Pain Control: 2/10 at rest and 2/10 with ambulation    Additional ROS:  She does not complain of pruritis at this time. She denies weakness, denies paresthesia, denies difficulties breathing or voiding, denies nausea or vomiting. She is able to ambulate and tolerates regular diet.    Objective:    Respiratory Function (RR / SpO2 / Airway Patency): Satisfactory    Cardiac Function (HR / Rhythm / BP): Satisfactory    Strength and sensation lower extremities: Normal    Site of spinal/epidural insertion: No signs of infection or inflammation.     Last Vitals: BP (!) 84/52   Pulse 73   Temp 36.8  C (98.3  F) (Oral)   Resp 18   LMP 2018   SpO2 100%   Breastfeeding? Unknown     Assessment and plan:   Jaqui Santos is a 38 year old female  POD # 1 s/p  C Section, Spinal anesthesia  with IT bupivacaine, fentanyl  and morphine; and single shot TAP nerve block injections with 20 mL bupivacaine 0.25% with epinephrine 1:200,000, then 20mL liposome bupivacaine (Exparel) long-acting 1.3% given in the PACU for postoperative analgesia.  Pt is ambulating without difficulty, no weakness or paresthesias.  There is no evidence of adverse side effects associated with spinal and/or nerve block injections.  The patient is receiving adequate incisional pain control at this time and anticipate up to 72 hours of incisional pain control.  However, we further anticipate that the patient will require opioid/nonopioid analgesics for visceral and muscle pain that is not controlled with local anesthetic.      In brief summary, her post-operative analgesia is adequate today. Further interventional analgesic  strategies would be of little utility at this time. Thus, we recommend proceeding with PO analgesics including staggered dosing of NSAIDs (ibuprofen) and acetaminophen, with a taper of oxycodone.       Noel Wiley  Anesthesiology Resident, PGY-2   [facility]   [phone]  2/14/2019 11:17 AM

## 2019-02-15 PROCEDURE — 12000001 ZZH R&B MED SURG/OB UMMC

## 2019-02-15 PROCEDURE — 25000132 ZZH RX MED GY IP 250 OP 250 PS 637: Performed by: STUDENT IN AN ORGANIZED HEALTH CARE EDUCATION/TRAINING PROGRAM

## 2019-02-15 RX ORDER — ACETAMINOPHEN 325 MG/1
650 TABLET ORAL EVERY 4 HOURS PRN
Qty: 60 TABLET | Refills: 0 | Status: SHIPPED | OUTPATIENT
Start: 2019-02-16 | End: 2019-03-18

## 2019-02-15 RX ORDER — IBUPROFEN 800 MG/1
800 TABLET, FILM COATED ORAL EVERY 6 HOURS PRN
Qty: 60 TABLET | Refills: 0 | Status: SHIPPED | OUTPATIENT
Start: 2019-02-15 | End: 2019-03-17

## 2019-02-15 RX ADMIN — IBUPROFEN 800 MG: 800 TABLET, FILM COATED ORAL at 06:49

## 2019-02-15 RX ADMIN — IBUPROFEN 800 MG: 800 TABLET, FILM COATED ORAL at 00:17

## 2019-02-15 RX ADMIN — ACETAMINOPHEN 975 MG: 325 TABLET, FILM COATED ORAL at 14:19

## 2019-02-15 RX ADMIN — ACETAMINOPHEN 975 MG: 325 TABLET, FILM COATED ORAL at 06:48

## 2019-02-15 RX ADMIN — IBUPROFEN 800 MG: 800 TABLET, FILM COATED ORAL at 14:18

## 2019-02-15 RX ADMIN — SENNOSIDES AND DOCUSATE SODIUM 1 TABLET: 8.6; 5 TABLET ORAL at 20:16

## 2019-02-15 RX ADMIN — IBUPROFEN 800 MG: 800 TABLET, FILM COATED ORAL at 20:16

## 2019-02-15 NOTE — PROGRESS NOTES
Post Partum Progress Note  PPD#2    Subjective:  Mary is doing well this morning. She notes that her c/s was much better than previous c/s and is very thankful for her care. She is tolerating PO without n/v. She is ambulating without dizziness. She is voiding without difficulty She is passing flatus. Denies headache, lightheadedness and dizziness.    Planning to breast feed, latch going well. Thinks she will do a mirena for contraception  Objective:  Vitals:    19 0812 19 1507 19 2050 02/15/19 0015   BP: (!) 84/52 111/88 111/71 102/71   Pulse:  75     Resp: 18 18 18 16   Temp: 98.3  F (36.8  C) 98  F (36.7  C) 98.1  F (36.7  C) 98.3  F (36.8  C)   TempSrc: Oral Axillary Oral Oral   SpO2:           General: NAD, resting comfortably  CV: Regular rate, well perfused.  Pulm: Normal respiratory effort.  Abd: Soft, non-tender, non-distended. Fundus is firm and at the umbilicus  Incision: Steri-strips in place, c/d/i  Ext: no lower extremity edema bilaterally. No calf tenderness.    Assessment/Plan:  Jaqui Santos is a 38 year old  female who is POD#2 s/p RLTCS.    - Encourage routine post-operative goals including ambulation and incentive spirometry  - PNC: Rh positive. Rubella immune, no intervention indicated  - Pain: controlled on oral medications  - Heme: Hgb 12.5>EBL 1174>Hgb 11.4.  - GI: continue anti-emetics and stool softeners as needed.  - : s/p peterson, voiding spontaneously  - Infant: Stable in room  - Feeding: Plans on breastfeeding.  - BC: Plans on Mirena postpartum    Discharge to home on POD#3    Carolynn Bowers MD  Ob/Gyn PGY-2  02/15/19 7:57 AM      [facility] staff:  Appreciate note by Dr. Bowers.  I have seen and examined the patient without the resident. I have reviewed, edited, and agree with the note.    My findings are:  Pt meeting goals. Would like another day of breast feeding assist d/t baby getting frenulum clipped today. Plan discharge tomorrow.      Abigail Adan MD, FACOG  2/15/2019  9:05 AM

## 2019-02-15 NOTE — PLAN OF CARE
VSS. Postpartum assessment WNL. Reports some abdominal discomfort - adequate pain control with ibuprofen and tylenol. Taking simethicone PRN d/t some referred gas pain in her R shoulder. She is breastfeeding independently. No concerns at this time. Continue plan of care.

## 2019-02-15 NOTE — PLAN OF CARE
Patient doing so well. Breastfeeding independently with excellent latch. Pain well controlled with tylenol and ibuprofen. VS and full assessment WDL.

## 2019-02-16 VITALS
SYSTOLIC BLOOD PRESSURE: 106 MMHG | TEMPERATURE: 97.8 F | OXYGEN SATURATION: 100 % | DIASTOLIC BLOOD PRESSURE: 78 MMHG | HEART RATE: 70 BPM | RESPIRATION RATE: 16 BRPM

## 2019-02-16 PROCEDURE — 25000132 ZZH RX MED GY IP 250 OP 250 PS 637: Performed by: STUDENT IN AN ORGANIZED HEALTH CARE EDUCATION/TRAINING PROGRAM

## 2019-02-16 RX ORDER — AMOXICILLIN 250 MG
2 CAPSULE ORAL 2 TIMES DAILY PRN
Qty: 60 TABLET | Refills: 0 | Status: SHIPPED | OUTPATIENT
Start: 2019-02-16 | End: 2019-03-18

## 2019-02-16 RX ORDER — OXYCODONE HYDROCHLORIDE 5 MG/1
5 TABLET ORAL EVERY 6 HOURS PRN
Qty: 12 TABLET | Refills: 0 | Status: SHIPPED | OUTPATIENT
Start: 2019-02-16 | End: 2019-02-19

## 2019-02-16 RX ADMIN — SENNOSIDES AND DOCUSATE SODIUM 2 TABLET: 8.6; 5 TABLET ORAL at 08:45

## 2019-02-16 RX ADMIN — ACETAMINOPHEN 975 MG: 325 TABLET, FILM COATED ORAL at 08:44

## 2019-02-16 RX ADMIN — ACETAMINOPHEN 975 MG: 325 TABLET, FILM COATED ORAL at 00:07

## 2019-02-16 RX ADMIN — IBUPROFEN 800 MG: 800 TABLET, FILM COATED ORAL at 06:35

## 2019-02-16 NOTE — PROGRESS NOTES
[facility]   Post-partum Note    Name:  Jaqui Santos  MRN: [id number]    S: Patient is feeling well this morning.  Pain is mostly controlled.  Denies dizziness, chest pain, SOB, nausea or vomiting. Tolerating regular diet without nausea or vomiting.  Ambulating without dizziness.  Spontaneously voiding. Reports flatus.  Lochia is less than menses.  Breast feeding.  Plans Mirena for postpartum contraception.     O:   Patient Vitals for the past 24 hrs:   BP Temp Temp src Pulse Resp   19 0007 106/78 97.8  F (36.6  C) Oral 70 16   02/15/19 2016 114/82 98.3  F (36.8  C) Oral 75 17   02/15/19 1154 106/68 97.6  F (36.4  C) Oral 85 16     General: NAD, resting comfortably  CV: Regular rate, well perfused.  Pulm: Normal respiratory effort.  Abd: Soft, non-tender, non-distended. Fundus is firm and at the umbilicus  Incision: Steri-strips in place, c/d/i  Ext: no lower extremity edema bilaterally. No calf tenderness.      No intake/output data recorded.    Hgb:   Hemoglobin   Date Value Ref Range Status   2019 11.4 (L) 11.7 - 15.7 g/dL Final       Assessment/Plan:  Jaqui Santos is a 38 year old  female who is POD#3 s/p RLTCS.     - Encourage routine post-operative goals including ambulation and incentive spirometry  - PNC: Rh positive. Rubella immune, no intervention indicated  - Pain: controlled on oral medications  - Heme: Hgb 12.5>EBL 1174>Hgb 11.4.  - GI: continue anti-emetics and stool softeners as needed.  - : s/p peterson, voiding spontaneously  - Infant: Stable in room  - Feeding: Plans on breastfeeding.  - BC: Plans on Mirena postpartum     Discharge to home today     Radha Burkett MD, PhD  Ob/Gyn, PGY-3  2019, 8:07 AM    Appreciate Dr. Burkett's note above, patient also seen and examined by me. I agree with the note above.   Lizette Zavaleta MD

## 2019-02-16 NOTE — PROVIDER NOTIFICATION
02/16/19 1118   Provider Notification   Provider Name/Title Dr. Adan   Method of Notification Electronic Page   Request Evaluate-Remote   Notification Reason Other     Patient would like to be discharged by 12. Pharmacy called, script for oxycodone needs to be signed.

## 2019-02-16 NOTE — PLAN OF CARE
Data: Vital signs and postpartum checks WDL  Patient eating and drinking normally. Patient able to empty bladder independently and is up ambulating.  Patient performing self cares and is able to care for infant. Breastfeeding on demand. Pumping and saving in the nursery fridge.  Action: Patient medicated during the shift for pain with Tylenol and Ibuprofen with relief after 1 hour. Patient education done see education record.  Response: Positive attachment behaviors observed with infant. Support persons family present.    Plan: Continue with the plan of care

## 2019-02-16 NOTE — PLAN OF CARE
Data: Vital signs stable, assessments within normal limits.   Breastfeeding independently, latch checked.   Discharge outcomes on care plan met, patient instructed of signs/symptoms to look for and report per discharge instructions.   No apparent pain.  Action: Review of care plan, teaching, and discharge instructions done with patient. Infant identification with ID bands done, patient verification with signature obtained.   Response: Patient states understanding and comfort with self cares and feeding. All questions addressed. Patient discharged with infant at 1600.

## 2019-02-22 NOTE — OP NOTE
[facility]   OPERATIVE NOTE:  SECTION     Surgery Date:  2019  Surgeon(s): Abigail Adan MD  Assistants:  Carolynn Bowers MD, PGY2    Preoperative Diagnoses:  1.  at 41w6d  2.  History of  section x1    Postoperative diagnoses:  Same as above    Procedure performed:  Repeat low segment transverse  section via Pfannenstiel skin incision with two layer uterine closure    Anesthesia:  Spinal with duramorph  Quant Blood Loss (mL): 1146 mL  Fluid replacement: 1000 mL crystalloid.  Urine output: 40 mL clear urine at the end of the case   Specimens: Cord blood  Complications: None      Operative findings:   1. Single, viable male infant at 0901 hours on 2018. Apgars 9&9 at 1&5 minutes.  Birth weight: 3480g.  Fetal presentation: HARRIS. Amniotic fluid: thick meconium.    2. Arterial Cord pH 7.09 with base excess 13.7.    3. Placenta intact with 3 vessel cord.     4. Normal appearing uterus, fallopian tubes, ovaries.   5. No intraabdominal adhesions.  Dense rectofascial adhesions     Indication: Jaqui Santos is a 38 year old, , who was admitted at 41w6d as a transfer of care from [facility] after he midwife found the fetal station to be too high for safe peterson balloon placement. After discussion with midwife and OB team, repeat  section was recommended due to unfavorable cervix and high fetal station. The risks, benefits, and alternatives of  delivery were explained and the patient agreed to proceed.     Procedure details:  After obtaining informed consent, the patient was taken to the operating room. She received 2g Ancef prior to the skin incision. She was placed in the dorsal supine position with a leftward tilt and prepped and draped in the usual sterile fashion.     Following test of adequate spinal anesthesia, the abdomen was entered through a Pfannenstiel skin incision through her previous scar. The skin  incision was made sharply and carried through the subcutaneous tissue to the fascia.  Fascia was incised in the midline and extended laterally with the Calles scissors.  The superior margin of the fascial incision was grasped with Kocher clamps and dissected from the underlying muscle with sharp and blunt dissecton, which was then repeated at the lower margin of the fascial incision.  The muscle was  in the midline.      The peritoneum was entered bluntly and the opening extended by digital dissection and electrocautery with care to avoid the bladder. A bladder blade was placed. The lower segment of the uterus was opened sharply in a transverse fashion and extended with digital pressure. Amniotomy was performed with Allis clamp with thick meconium stained fluid. The infant's head was noted to be in the HARRIS position. It was elevated to the level of the hysterotomy and was delivered atraumatically, shoulders delivered easily thereafter. The cord was doubly clamped after 60 seconds and cut and the infant was handed off to the waiting NICU staff. A segment of the cord was cut and sent for cord gases.      The placenta was expressed.  The uterus was exteriorized from the abdomen and cleared of all clots and debris.  The uterus was massaged and was noted to be firm.  Pitocin was given through the running IV.  With vigorous massage as well as administration of pitocin, good uterine tone was achieved. The hysterotomy was repaired with 0-vicryl suture in a running locked fashion. A 2nd layer of 0-monocryl was  used to imbricate the incision and good hemostasis was achieved.The posterior cul-de-sac was suctioned and the uterus was returned to the abdomen.      The bilateral pericolic gutters were suctioned.  The hysterotomy was again inspected and found to be hemostatic.  The abdominal wall was examined and also found to be hemostatic.  The fascia was closed with a running suture of 0-Vicryl.  Subcutaneous tissue was  irrigated. Areas that were not hemostatic were controlled with cautery. The subcutaneous tissue was less than 3cm in depth and did not require reapproximation. The skin was closed with 4-0 monocryl. The patient tolerated the procedure well and was taken to the recovery room in stable condition. All sponge, needle and instrument counts were correct x2.     Dr. Adan was present for the entire procedure.     Carolynn Bowers MD  Ob/Gyn PGY-2    Staff:  I was scrubbed and present for entire case and agree w/ above note.    Abigail Adan MD

## 2019-03-04 ENCOUNTER — DOCUMENTATION ONLY (OUTPATIENT)
Dept: CARE COORDINATION | Facility: CLINIC | Age: 39
End: 2019-03-04

## 2019-03-04 NOTE — PROGRESS NOTES
[facility] will be sharing updates with you on Maternal Child Health Referral requests for home care services.  This is for care coordination purposes and alert you to referral status.  We received the referral for  Jaqui Santos; MRN [id number] and want to update you:    [facility] has made two attempts to contact patient by phone and text message over the last four days.   We have not had any response from patient.  Final message was left advising patient to follow up with Primary Care Providers for mom and baby.  Ordering MD and Primary Care Providers for mom and baby notified.       Sincerely [facility]  Katie Casillas  [phone]

## 2019-12-08 ENCOUNTER — HEALTH MAINTENANCE LETTER (OUTPATIENT)
Age: 39
End: 2019-12-08

## 2020-03-15 ENCOUNTER — HEALTH MAINTENANCE LETTER (OUTPATIENT)
Age: 40
End: 2020-03-15

## 2021-01-10 ENCOUNTER — HEALTH MAINTENANCE LETTER (OUTPATIENT)
Age: 41
End: 2021-01-10

## 2021-06-01 VITALS — HEIGHT: 65 IN | BODY MASS INDEX: 22.78 KG/M2 | WEIGHT: 136.7 LBS

## 2021-06-01 VITALS — HEIGHT: 65 IN | BODY MASS INDEX: 23.99 KG/M2 | WEIGHT: 144 LBS

## 2021-06-01 VITALS — WEIGHT: 134.5 LBS | BODY MASS INDEX: 22.41 KG/M2 | HEIGHT: 65 IN

## 2021-06-01 VITALS — HEIGHT: 65 IN | WEIGHT: 141 LBS | BODY MASS INDEX: 23.49 KG/M2

## 2021-06-02 VITALS — BODY MASS INDEX: 26.99 KG/M2 | WEIGHT: 162 LBS | HEIGHT: 65 IN

## 2021-06-02 VITALS — WEIGHT: 155 LBS | BODY MASS INDEX: 25.83 KG/M2 | HEIGHT: 65 IN

## 2021-06-02 VITALS — BODY MASS INDEX: 26.66 KG/M2 | HEIGHT: 65 IN | WEIGHT: 160 LBS

## 2021-06-02 VITALS — WEIGHT: 160 LBS | HEIGHT: 65 IN | BODY MASS INDEX: 26.66 KG/M2

## 2021-06-19 NOTE — PROGRESS NOTES
"Mary presents alone today.   She is surprised that she has gained 10 lbs already, but went back to her records with Bala and she gained 10 lbs quickly with him as well. She gained 35 lbs total with Bala.   Mary is eating well, noticing that she needs to eat small frequent meals. Believes she is getting at least 80 grams of protein a day. She is exercising at home, no longer doing rowing as it causes \"coning\" of her abdomen through her diastasis recti.   She is scheduled to see a physical therapist within [facility] and is learning which exercises don't cause more tension on her diastasis.   Accepts AFP today  Declines Level 2 US, opts for routine anatomy scan at .   Denies cramping or bleeding.   "

## 2021-06-19 NOTE — PROGRESS NOTES
PRENATAL VISIT   FIRST OBSTETRICAL EXAM - OB    Assessment / Impression     36 yo,  at 11w1d  Fetal heart tones not heard during today's visit, declines ultrasound  Hx of 11.5 week miscarriage  Hx of  section (11/5/15) for fetal distress, Desires TOLAC  Currently breastfeeding 1x day  Vaginal irritation, vaginal yeast noted today  At risk for lead exposure    Plan:        1. Initial labs drawn including.  Patient is not eligible for a water birth. Lead screening indicated due to history of son's elevated blood lead levels  2. Hx of  section: Reviewed our group's success with  and our process to consult with Dr. Barragan regarding her desire to TOLAC. Will obtain her operative report.  3. Medications: Prenatal vitamins. Encouraged a vitamin D3 geltab, 5030-4968 IU and an omega 3 fatty acid supplement daily.  4. Genetic screening options and timelines discussed, and ordered.  Desires Innatal cell free fetal DNA testing.  Not want to know gender of the baby.  Open to level 2 ultrasound  5. Role of ultrasound in pregnancy discussed; dating/viability ultrasound declined despite not hearing heart tones today  6. Oriented to [facility] care and philosophy, group, on-call and contact info discussed. Reviewed prenatal care schedule. IOB packet given and reviewed.  7. Appropriate anticipatory guidance including nutrition & supplements, weight gain recommendations 21.6 (BMI: 18.5-24.9: 25-35#), exercise, resources, lab testing, & warning signs discussed. Questions answered.   8. Danger s/sx for this trimester reviewed with patient.  9. Wet prep collected: Vaginal yeast noted.   Reviewed vulvar hygiene and strategies to prevent vaginal infections.Advised to treat with vaginal antifungal cream  10. Follow up: Return to clinic in 4 weeks or sooner.    Total time spent with patient 40 minutes, >50% counseling, education and coordination of care.    Subjective:    Jaqui Santos is a 37 y.o.  here today for  her First Obstetrical Exam with  Ever and son Bala (2.6 yo). She is a  at 11w1d.  This pregnancy is planned.   LMP 18.  Last period was normal.  Jaqui reports regular periods occurring [id number].  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  She states she's tried many things for nausea.  Informed her of B6 and Unisom combination.  She will consider trying this.  She states she's learned the balance of not too much food and not too little to keep her most comfortable.  She denies vomiting.    Unable auscultate fetal heart tones on this visit.  Jaqui was nervous because she had an 11.5 week miscarriage.  She stated she was at her 11 week prenatal visit and fetal heart tones were not heard.  She was sent for ultrasound where her miscarriage was diagnosed.  Offered an ultrasound to assess fetal well-being and Jaqui declined.  She declined coming back in a week or two to check fetal heart tones.  She says she's going to try not to worry.    Jaqui reflects on the birth of her son Bala.  She had a  section due to fetal intolerance. She was planning to birth at [facility].   She wishes to TOLAC with this pregnancy.  She continues to breastfeed Bala about one time a day but states that due to breast tenderness she plans to completely wean him in a week.      OB History    Para Term  AB Living   4 1 1 0 2 1   SAB TAB Ectopic Multiple Live Births   1 1 0 0 1      # Outcome Date GA Lbr Nic/2nd Weight Sex Delivery Anes PTL Lv   4 Current            3 Term 11/05/15 42w3d  8 lb 14 oz (4.026 kg) M Primary C/S,         Complications: Fetal Intolerance      Birth Comments: Low heart tones with postdates testing, CS for fetal intolerance with [facility], CS at [facility]   2 SAB 2014 11w0d             Birth Comments: no FHT on 11 wk US, no other  "symptoms, D & C, no complications   1 TAB 2004 6w0d             Birth Comments: 6 weeks D & C, no complications            Past Medical History:   Diagnosis Date     Migraine     improved after cervical spinal fusion 2018     Past Surgical History:   Procedure Laterality Date     CERVICAL FUSION  2018    C6-C7      SECTION, LOW TRANSVERSE  2015    Primary  for fetal distress     Social History   Substance Use Topics     Smoking status: Former Smoker     Smokeless tobacco: Never Used     Alcohol use None      Comment: none with pregnancy     Current Outpatient Prescriptions   Medication Sig Dispense Refill     cholecalciferol, vitamin D3, 2,000 unit capsule Take 1 capsule by oral route.       omega-3 fatty acids-fish oil 300-1,000 mg cap Take by mouth.       prenatal 21/iron fu/folic acid (PRENATAL COMPLETE ORAL) daily       No current facility-administered medications for this visit.      No Known Allergies         Review of Systems  General:  Denies problem  Eyes: Denies problem  Ears/Nose/Throat: Denies problem  Cardiovascular: Denies problem  Respiratory:  Denies problem  Gastrointestinal:  nausea  Genitourinary: denies problems  Musculoskeletal:  Denies problem  Skin: Denies problem  Neurologic:denies problems  Psychiatric: denies problems  Endocrine: denies problems    Objective:   Objective    Vitals:    18 1103   BP: 92/60   Pulse: 68   Weight: 134 lb 8 oz (61 kg)   Height: 5' 5\" (1.651 m)     Physical Exam  General Appearance: Alert, cooperative, no distress, appears stated age  Skin: Skin color, texture, turgor normal, no rashes or lesions  Throat: Lips, mucosa, and tongue normal; teeth and gums normal  HEENT: grossly normal; otoscopic and opthalmic exam not performed.   Neck: Supple, symmetrical, trachea midline, no adenopathy; thyroid: not enlarged, symmetric, no tenderness/mass/nodules  Lungs: Clear to auscultation bilaterally, respirations unlabored.  Breasts: No breast " masses, tenderness, asymmetry, or nipple discharge. Nipples everted.  Heart: Regular rate and rhythm, S1 and S2 normal, no murmur, rub, or gallop  Abdomen: Soft, non-tender, no masses, no organomegaly. Unable to hear fetal heart tones.  Pelvic:Normally developed genitalia with no external lesions or eruptions. Slightly erythematous vulva. Vagina and cervix show no lesions, inflammation, tenderness. Wet prep collected  No cystocele, No rectocele. Uterus retroverted and feels 11 weeks in size, slightly displaced to maternal left.  No adnexal mass or tenderness.    Extremities: Extremities normal without varicosities or edema    EPDS: 2/never    Lab:   Results for orders placed or performed in visit on 07/13/18   Urine Culture   Result Value Ref Range    Culture Mixture of urogenital organisms    Wet Prep, Vaginal   Result Value Ref Range    Yeast Result Yeast Seen (!) No yeast seen    Trichomonas No Trichomonas seen No Trichomonas seen    Clue Cells, Wet Prep No Clue cells seen No Clue cells seen   Hepatitis B Surface Antigen (HBsAG)   Result Value Ref Range    Hepatitis B Surface Ag Negative Negative   HML Antibody Screen   Result Value Ref Range    HML Antibody Screen Negative Negative   Syphilis Screen, Cascade   Result Value Ref Range    Treponema Antibody (Syphilis) Negative Negative   Urinalysis   Result Value Ref Range    Color, UA Yellow Colorless, Yellow, Straw, Light Yellow    Clarity, UA Clear Clear    Glucose, UA Negative Negative    Bilirubin, UA Negative Negative    Ketones, UA Negative Negative    Specific Gravity, UA 1.010 1.005 - 1.030    Blood, UA Negative Negative    pH, UA 7.0 5.0 - 8.0    Protein, UA Negative Negative mg/dL    Urobilinogen, UA 0.2 E.U./dL 0.2 E.U./dL, 1.0 E.U./dL    Nitrite, UA Negative Negative    Leukocytes, UA Negative Negative   HIV Antigen/Antibody Screening Cascade   Result Value Ref Range    HIV Antigen / Antibody Negative Negative   Lead, Blood   Result Value Ref Range     Lead  <5.0 ug/dL    Collection Method Venous     Lead Retest No    HM1 (CBC with Diff)   Result Value Ref Range    WBC 8.8 4.0 - 11.0 thou/uL    RBC 4.06 3.80 - 5.40 mill/uL    Hemoglobin 12.8 12.0 - 16.0 g/dL    Hematocrit 37.7 35.0 - 47.0 %    MCV 93 80 - 100 fL    MCH 31.5 27.0 - 34.0 pg    MCHC 34.0 32.0 - 36.0 g/dL    RDW 13.6 11.0 - 14.5 %    Platelets 264 140 - 440 thou/uL    MPV 9.0 8.5 - 12.5 fL    Neutrophils % 75 (H) 50 - 70 %    Lymphocytes % 17 (L) 20 - 40 %    Monocytes % 6 2 - 10 %    Eosinophils % 1 0 - 6 %    Basophils % 1 0 - 2 %    Neutrophils Absolute 6.6 2.0 - 7.7 thou/uL    Lymphocytes Absolute 1.5 0.8 - 4.4 thou/uL    Monocytes Absolute 0.5 0.0 - 0.9 thou/uL    Eosinophils Absolute 0.1 0.0 - 0.4 thou/uL    Basophils Absolute 0.1 0.0 - 0.2 thou/uL         RYANNE Chua  Patient was seen with student who was present for learning.  I personally assessed, examined and made clinical decisions reflected in the documentation.  ZAC Simmons,CNM

## 2021-06-19 NOTE — PROGRESS NOTES
Jaqui presents to clinic for a fetal heart tone check as she was unable to hear heart tones at her last visit.  Heart tones found without difficulty and were regular and 155 bpm.  Patient feeling distressed as she feels her rectus diastasis separation is already present and causing her discomfort.  She also has an umbilical hernia that presented recently.  She tells me that in her last pregnancy this occurred at 18 weeks.  Physical therapy referral provided.  Patient asking if I know of any professionals in the community outside of the [facility] system.  I provided her the name of SEBASTIAN Shea, a pelvic floor physical therapist at [facility] as she has a very good reputation.  I encouraged her to give [facility] physical therapy [place] to try as they are also very good.  Return to clinic for regularly scheduled visit at 4-6 week interval.

## 2021-06-20 NOTE — PROGRESS NOTES
Mary presents alone today  She has her 20 week anatomy scan tomorrow  Mary has seen Mery Marie for physical therapy to help with diastasis recti, feels this has been very helpful to improve her core strength.   She and Ever are struggling and he often does not sleep at home. Mary reports she has always been the one to care for Bala, and knows that Ever likely will not be supportive or helpful with this baby.   She is likely going to consider having a  for her birth, considering Raquel Ibarra. Her mother will help her postpartum.   Will place referral to Dr. Barragan for a TOLAC consult  Discussed GCT/Hgb at 28 weeks. She prefers to do the honey test rather than Glucola, discussed that the honey test has not been verified and our group requests that she do the glucola drink to screen for GDM. She is not excited about this, but thinks she may be able to tolerate the lime drink.  Denies cramping or bleeding.   Notes baby is on the right side of her abdomen. Reports good fetal movement.

## 2021-06-21 NOTE — PROGRESS NOTES
Mary presents alone today. Accepts GCT, Hgb, declines RPR and Tdap  Had her  consult with Dr. Barragan today.  Mary has requested Raquel Ibarra, chiropractor and  to be her .  Hoping for low intervention, unmedicated, open to nitrous oxide. Wants to be supported in her effort to have a   Denies s/sx of PTL or preeclampsia  Reports her relationship with Ever is better.   She is taking a  childbirth education review course  Plans to get a flu vaccine with her son Bala. Declining Tdap for now, please revisit next visit.

## 2021-06-21 NOTE — PROGRESS NOTES
CC: The patient is being seen as a consult from Yandy De Jesus CNM, secondary to a prior  section.    HPI: The pt is a 38 y.o. MWF  at 28 weeks gestation who presents with a history of a  section.  She had her  secondary to fetal intolerance of labor.  She had her  section on 11/5/15.  It was a low transverse incision closed in 2 layers.  The fetal head was not engaged; she did get to 7 cm before the fetal heart rate became an issue.  With this pregnancy she would like to attempt a vaginal delivery.  She is thinking she may want 2 children total.  She had the success calculator done which showed a predicted success for  of 73%.    Past Medical History:   Diagnosis Date     Migraine     improved after cervical spinal fusion        Past Surgical History:   Procedure Laterality Date     CERVICAL FUSION  2018    C6-C7      SECTION, LOW TRANSVERSE  2015    fetal intolerance, 2 layers       Patient's   Family History   Problem Relation Age of Onset     Alcohol abuse Mother      Depression Mother      Cancer Maternal Grandfather        Patient   Social History     Social History     Marital status:      Spouse name: Ever/ musician     Number of children: 1     Years of education: college grad     Occupational History     works at [employer]      Social History Main Topics     Smoking status: Former Smoker     Smokeless tobacco: Never Used     Alcohol use None      Comment: none with pregnancy     Drug use: No     Sexual activity: Yes     Partners: Male     Other Topics Concern     None     Social History Narrative       Current Outpatient Prescriptions   Medication Sig Dispense Refill     cholecalciferol, vitamin D3, 2,000 unit capsule Take 1 capsule by oral route.       prenatal 21/iron fu/folic acid (PRENATAL COMPLETE ORAL) daily       omega-3 fatty acids-fish oil 300-1,000 mg cap Take by mouth.       No current facility-administered medications for  "this visit.        Patient has No Known Allergies.    ROS:  12 part ROS is negative aside from those symptoms in the HPI    PE:  /60  Pulse 72  Ht 5' 5\" (1.651 m)  Wt 155 lb (70.3 kg)  LMP 2018  Breastfeeding? No  BMI 25.79 kg/m2          Body mass index is 25.79 kg/(m^2).    General: WN/WD WF, NAD  Abdomen: gravid  Psych: normal mood  Neuro: CN I-XII grossly intact  MS: normal gait    Assessment: 38 y.o. MWF  at 28 weeks gestation with a prior  section.    Plan: Both vaginal delivery and  delivery risks and benefits were discussed with her.  We discussed what happened the last time and how it can impact this delivery and chance for success for a .  We discussed that I believe her chance at successful vaginal birth is around 80-85%.  We discussed the approximately 1% risk with either method of delivery, just different risks with each.  We also discussed that the risk involved with a  section is higher for someone who has labored first.   We discussed that with attempt at vaginal delivery, she would need to come to the hospital sooner than if she hadn't had surgery in the past.  We also discussed that induction is avoided if possible, but if it needs to happen because of gestational age or medical issue, Pitocin can be used, but it increases the risk to about 1.9%.  We discussed the recovery with vaginal delivery is usually easier than with  section and usually has a shorter stay in the hospital.  We discussed that epidural as well as other pain medications are still options if she desires with  attempt.  We discussed the benefits to the baby with vaginal delivery.  We also discussed the risks of surgery, especially as the number of surgeries increases.  We discussed that during the labor process she can change her mind if she wants a repeat  section.  We discussed the hospital stay and recovery limitations, especially with lifting and driving.  " We discussed the slightly increased risk of transient tachypnea of the  with  compared to vaginal delivery.  We discussed the timing of scheduled cesareans being no earlier than 39 weeks gestation unless there is some other medical issue that would make delivery earlier necessary.  Consent was signed after all questions were answered.    Approximately 30 minutes were spent with the patient with the majority in counseling.

## 2021-06-22 NOTE — PROGRESS NOTES
Jaqui Santos is here with her son for a routine prenatal visit at 32w1d.  She has no concerns and is feeling well.   She is feeling fetal movement regularly. Fetal maturity and expectations for fetal movement in the third trimester.  Appetite is increased. Interval weight gain is excessive.  Reviewed 28 week lab results.  TdaP desires at a future visit. Received flu vaccine at another health facility.   Discussed how to pre-register on our website after 30 weeks. Discussed birth control options after delivery, patient plans Paragard.  Discussed choosing a provider for infant care, patient [facility].  Anticipatory guidance, warning signs (with emphasis on  labor signs and symptoms), when to call and CNM contact information reviewed.

## 2021-06-22 NOTE — PROGRESS NOTES
Mary presents alone  Accepts GBS, Hgb. Declines VE to assess fetal presentation, accepts US which shows vertex presentation  Discussed our recommendation that she have admission labs and an IV placed when in labor. She accepts this, but is nervous about how she will feel in the hospital. She has a long history of multiple medical issues as a child and does not want to feel like she is loosing her autonomy being at the hospital.   She is considering giving birth at [facility] in [facility]. She is having a hard time with her decision but will let us know either way. Discussed the concern that they would not be able to monitor her continuously, which is recommended given that she has a scar on her uterus. She understands the risks.   Would like to get a breast pump today, Spectra given.   Denies s/sx of preeclampsia or PTL.

## 2021-06-22 NOTE — PROGRESS NOTES
Mary presents alone today.   Accepts GBS and Hgb next visit. Accepts VE. By Le[place] baby is ROP, encouraged spinning baby exercises  Feeling well, reports good fetal movement  Needs a breast pump, would like to get one next visit  Discuss admission labs and continuous fetal monitoring during her TOLAC next visit  Discussed the benefits of Tdap vaccination during pregnancy, accepts today  Just went to [place] with Ever and they are feeling more connected.   Denies s/sx of preeclampsia.   EDPS = 2 Feeling good

## 2021-06-23 NOTE — TELEPHONE ENCOUNTER
TC: Called Mary to check in regarding her birth - to see if she has given birth, or how her care is going at [facility].   Offered for her to call me back today while I am in clinic.    ZAC Simmons,LUIS M

## 2021-07-14 PROBLEM — O09.521 ELDERLY MULTIGRAVIDA IN FIRST TRIMESTER: Status: RESOLVED | Noted: 2018-07-13 | Resolved: 2019-02-18

## 2021-10-11 ENCOUNTER — IMMUNIZATION (OUTPATIENT)
Dept: PEDIATRICS | Facility: CLINIC | Age: 41
End: 2021-10-11
Payer: COMMERCIAL

## 2021-10-11 PROCEDURE — 90686 IIV4 VACC NO PRSV 0.5 ML IM: CPT

## 2021-10-11 PROCEDURE — 90471 IMMUNIZATION ADMIN: CPT

## 2022-02-12 ENCOUNTER — HEALTH MAINTENANCE LETTER (OUTPATIENT)
Age: 42
End: 2022-02-12

## 2022-02-17 PROBLEM — O09.529 SUPERVISION OF HIGH-RISK PREGNANCY OF ELDERLY MULTIGRAVIDA: Status: RESOLVED | Noted: 2018-07-13 | Resolved: 2019-02-18

## 2022-10-09 ENCOUNTER — HEALTH MAINTENANCE LETTER (OUTPATIENT)
Age: 42
End: 2022-10-09

## 2022-10-14 ENCOUNTER — THERAPY VISIT (OUTPATIENT)
Dept: PHYSICAL THERAPY | Facility: CLINIC | Age: 42
End: 2022-10-14
Payer: COMMERCIAL

## 2022-10-14 DIAGNOSIS — M62.89 PELVIC FLOOR DYSFUNCTION: ICD-10-CM

## 2022-10-14 PROCEDURE — 97530 THERAPEUTIC ACTIVITIES: CPT | Mod: GP | Performed by: PHYSICAL THERAPIST

## 2022-10-14 PROCEDURE — 97161 PT EVAL LOW COMPLEX 20 MIN: CPT | Mod: GP | Performed by: PHYSICAL THERAPIST

## 2022-10-14 NOTE — PROGRESS NOTES
Physical Therapy Initial Evaluation  Subjective:  The history is provided by the patient. No  was used.   Patient Health History  Jaqui Santos being seen for PT pelvic floor,  .     Date of Onset: 10/14/22.   Problem occurred: pregnency   Pain is reported as 0/10 on pain scale.  General health as reported by patient is excellent.  Health conditions: smoking in the past.   Red flags:  None as reported by patient.      Other surgery history details: c6-7 cervical fusion.    Current medications:  None.    Current occupation is [employer].   Primary job tasks include:  Computer work, driving, lifting/carrying, prolonged sitting and prolonged standing.                   Therapist Assessment:   Clinical Impression: Pt presents with primary complaint of dyspareunia and urinary urgency.  Per clinical examination, pt with proximal tension.  Pt will benefit from skilled physical therapy for down training nervous system proximal stretching and coordination exercises as well as urge suppression instruction and bladder health information.    Chief Complaint:  Pt notes seeing pelvic PT prior, which was very helpful.     When she was 2 years old she had bladder reflux and needed surgery to correct that, she had to have another surgery when she was 4. About 2x a year since she was 4 she would have a stress test, which involved be catheterized. She feels that it caused trauma/tension to her pelvic muscles.    With her first birth she had a challenging labor that ended up needing a . Her goal was having a  with her second pregnancy but ended up needing to have a  with that delivery.    Currently she has pain with intercourse and inserting a tampon. She was able to get to pain free intercourse after her first delivery. The pt has been working on engaging her deep core muscles.    The pt notes she grinds her teeth a lot, and she feels a lot of midline tension.    She notes that her  "right arm is weaker/unstable since her neck injury.    She is breastfeeding her 3.5 year old.    Current activity: walks frequently, \"snack\" exercises- short bouts of strengthening or body weight exercises  Would like to return to cross country skiing, get in a routine working out more    Goals: comfortable intercourse, no urgency/leaking    Urination   Do you leak on the way to the bathroom or with a strong urge to void? Yes- if she waits too long as she approaches the bathroom the urgency kicks in  Do you leak with cough, sneeze, jumping, running? no  Any other activities that cause leaking? Walking toward the bathroom  Do you have triggers that make you feel you can't wait to go to the bathroom? Walking toward the bathroom - happens every week  When you leak what is the amount? Small drops when she undresses   How long can you delay the need to urinate? varies  How many times do you get up to urinate at night? 1  How many times do you urinate during the day? About every 2 hours  Can you stop the flow of urine when on the toilet? yes  Is the volume of urine passed usually: average  Do you strain to pass urine? no  Do you have a slow or hesitant urinary stream? no  Do you have difficulty initiating the urine stream? no  How many bladder infections have you had in the last 12 months? none  What is you fluid intake per day? Water (8oz) 50-70oz  Caffeine 16oz  Alcohol none  Do you feel like you empty completely when voiding? Yes- sometimes will feel rushed to urinate when working from home and has a meeting back to back    Bowel Habits  Frequency of bowel movements? 1x a day  Consistency of stool? [place] stool scale? Type 4-5  Do you ignore the urge to defecate? no  Do you strain to pass stool? no  Do you feel that you empty completely? yes    Pelvic Pain  When do you have pelvic pain? Yes- with vaginal penetration, occasional right sided anterior hip sharp pain that is brief, and low back stiffness   Are you " sexually active? yes  Is initial penetration during intercourse painful? yes  Is deeper penetration painful? yes  Do you use lubrication?    Marinoff Scale:Level 2  (Level 3: Abstinence from intercourse because of severe pain. Level 2: Painful intercourse which limites frequency of activity. Level 1: Painful intercourse not severe enough to prevent activity.)  Have you given birth? Yes, 2 c-sections  Have you ever been worried for your physical safety? no  Any abdominal or pelvic surgeries? no  Are you having regular exercise? yes  Have you practiced the PF (kegel) exercise for 4 or more weeks? no    Objective:    POSTURE: scapular winging bilaterally    FLEXIBILITY:tension of piriformis, and adductors bilaterally    EXTERNAL ASSESSMENT:- will perform at next session due to time constraints of pt needing to leave early       INTERNAL VAGINAL ASSESSMENT:- will perform at next session due to time constraints of pt needing to leave early    Assessment/Plan:    Patient is a 42 year old female with pelvic complaints.    Patient has the following significant findings with corresponding treatment plan.                Diagnosis 1:  Pelvic floor dysfunction  Pain -  hot/cold therapy, US, electric stimulation, mechanical traction, manual therapy, STS, splint/taping/bracing/orthotics, self management, education, directional preference exercise and home program  Decreased ROM/flexibility - manual therapy, therapeutic exercise, therapeutic activity and home program  Impaired muscle performance - biofeedback, electric stimulation, neuro re-education and home program  Decreased function - therapeutic activities, home program and functional performance testing    Therapy Evaluation Codes:   Cumulative Therapy Evaluation is: Low complexity.    Previous and current functional limitations:  (See Goal Flow Sheet for this information)    Short term and Long term goals: (See Goal Flow Sheet for this information)     Communication  ability:  Patient appears to be able to clearly communicate and understand verbal and written communication and follow directions correctly.  Treatment Explanation - The following has been discussed with the patient:   RX ordered/plan of care  Anticipated outcomes  Possible risks and side effects  This patient would benefit from PT intervention to resume normal activities.   Rehab potential is good.    Frequency:  1 X week, once daily  Duration:  for 4 weeks tapering to 2 X a month over 8 weeks  Discharge Plan:  Achieve all LTG.  Independent in home treatment program.  Reach maximal therapeutic benefit.    Please refer to the daily flowsheet for treatment today, total treatment time and time spent performing 1:1 timed codes.

## 2022-10-28 ENCOUNTER — THERAPY VISIT (OUTPATIENT)
Dept: PHYSICAL THERAPY | Facility: CLINIC | Age: 42
End: 2022-10-28
Payer: COMMERCIAL

## 2022-10-28 DIAGNOSIS — M62.89 PELVIC FLOOR DYSFUNCTION: Primary | ICD-10-CM

## 2022-10-28 PROCEDURE — 97530 THERAPEUTIC ACTIVITIES: CPT | Mod: GP | Performed by: PHYSICAL THERAPIST

## 2022-10-28 PROCEDURE — 97140 MANUAL THERAPY 1/> REGIONS: CPT | Mod: GP | Performed by: PHYSICAL THERAPIST

## 2022-10-28 PROCEDURE — 97112 NEUROMUSCULAR REEDUCATION: CPT | Mod: GP | Performed by: PHYSICAL THERAPIST

## 2022-11-04 ENCOUNTER — THERAPY VISIT (OUTPATIENT)
Dept: PHYSICAL THERAPY | Facility: CLINIC | Age: 42
End: 2022-11-04
Payer: COMMERCIAL

## 2022-11-04 DIAGNOSIS — M62.89 PELVIC FLOOR DYSFUNCTION: Primary | ICD-10-CM

## 2022-11-04 PROCEDURE — 97110 THERAPEUTIC EXERCISES: CPT | Mod: GP | Performed by: PHYSICAL THERAPIST

## 2022-11-04 PROCEDURE — 97140 MANUAL THERAPY 1/> REGIONS: CPT | Mod: GP | Performed by: PHYSICAL THERAPIST

## 2022-11-04 PROCEDURE — 97530 THERAPEUTIC ACTIVITIES: CPT | Mod: GP | Performed by: PHYSICAL THERAPIST

## 2022-11-08 ENCOUNTER — THERAPY VISIT (OUTPATIENT)
Dept: PHYSICAL THERAPY | Facility: CLINIC | Age: 42
End: 2022-11-08
Payer: COMMERCIAL

## 2022-11-08 DIAGNOSIS — M62.89 PELVIC FLOOR DYSFUNCTION: Primary | ICD-10-CM

## 2022-11-08 PROCEDURE — 97530 THERAPEUTIC ACTIVITIES: CPT | Mod: GP | Performed by: PHYSICAL THERAPIST

## 2022-11-08 PROCEDURE — 97110 THERAPEUTIC EXERCISES: CPT | Mod: GP | Performed by: PHYSICAL THERAPIST

## 2022-11-15 ENCOUNTER — IMMUNIZATION (OUTPATIENT)
Dept: PEDIATRICS | Facility: CLINIC | Age: 42
End: 2022-11-15
Payer: COMMERCIAL

## 2022-11-15 PROCEDURE — 90471 IMMUNIZATION ADMIN: CPT

## 2022-11-15 PROCEDURE — 90686 IIV4 VACC NO PRSV 0.5 ML IM: CPT

## 2022-11-18 ENCOUNTER — THERAPY VISIT (OUTPATIENT)
Dept: PHYSICAL THERAPY | Facility: CLINIC | Age: 42
End: 2022-11-18
Payer: COMMERCIAL

## 2022-11-18 DIAGNOSIS — M62.89 PELVIC FLOOR DYSFUNCTION: Primary | ICD-10-CM

## 2022-11-18 PROCEDURE — 97112 NEUROMUSCULAR REEDUCATION: CPT | Mod: GP | Performed by: PHYSICAL THERAPIST

## 2022-11-18 PROCEDURE — 97110 THERAPEUTIC EXERCISES: CPT | Mod: GP | Performed by: PHYSICAL THERAPIST

## 2022-11-18 PROCEDURE — 97140 MANUAL THERAPY 1/> REGIONS: CPT | Mod: GP | Performed by: PHYSICAL THERAPIST

## 2022-11-22 NOTE — TELEPHONE ENCOUNTER
Diagnosis, Referred by & from: Anal Skin Tags   Appt date: 2/28/2023   NOTES STATUS DETAILS   OFFICE NOTE from referring provider N/A    OFFICE NOTE from other specialist [facility] [place] [facility]:  11/18/22 - PT OV with Charisse Love, [facility]   DISCHARGE SUMMARY from hospital N/A    DISCHARGE REPORT from the ER N/A    OPERATIVE REPORT N/A    MEDICATION LIST Internal    LABS     ANAL PAP/CEA N/A    BIOPSIES/PATHOLOGY RELATED TO DIAGNOSIS N/A    DIAGNOSTIC PROCEDURES     PFC TESTING (from the Pelvic floor center includes Manometry, PDNL, EMG, etc.) N/A    COLONOSCOPY N/A    UPPER ENDOSCOPY (EGD) N/A    FLEX SIGMOIDOSCOPY N/A    ERCP N/A    IMAGING (DISC & REPORT)      CT N/A    MRI N/A    XRAY N/A    ULTRASOUND  (ENDOANAL/ENDORECTAL) N/A

## 2022-12-05 ENCOUNTER — TELEPHONE (OUTPATIENT)
Dept: SURGERY | Facility: CLINIC | Age: 42
End: 2022-12-05

## 2022-12-05 NOTE — TELEPHONE ENCOUNTER
MELIDA and sent [facility] w pod number that 2/28 appt with Tianna needs rescheduling, provider has jury duty. Per Marysol ROMAN, pt can r/s over a held slot with Ladonna Nuno - held slots in Jan or Feb.

## 2022-12-09 ENCOUNTER — THERAPY VISIT (OUTPATIENT)
Dept: PHYSICAL THERAPY | Facility: CLINIC | Age: 42
End: 2022-12-09
Payer: COMMERCIAL

## 2022-12-09 DIAGNOSIS — M62.89 PELVIC FLOOR DYSFUNCTION: Primary | ICD-10-CM

## 2022-12-09 PROCEDURE — 97110 THERAPEUTIC EXERCISES: CPT | Mod: GP | Performed by: PHYSICAL THERAPIST

## 2022-12-20 ENCOUNTER — THERAPY VISIT (OUTPATIENT)
Dept: PHYSICAL THERAPY | Facility: CLINIC | Age: 42
End: 2022-12-20
Payer: COMMERCIAL

## 2022-12-20 DIAGNOSIS — M62.89 PELVIC FLOOR DYSFUNCTION: Primary | ICD-10-CM

## 2022-12-20 PROCEDURE — 97140 MANUAL THERAPY 1/> REGIONS: CPT | Mod: GP | Performed by: PHYSICAL THERAPIST

## 2022-12-20 PROCEDURE — 97110 THERAPEUTIC EXERCISES: CPT | Mod: GP | Performed by: PHYSICAL THERAPIST

## 2022-12-27 ENCOUNTER — TELEPHONE (OUTPATIENT)
Dept: SURGERY | Facility: CLINIC | Age: 42
End: 2022-12-27

## 2022-12-27 NOTE — TELEPHONE ENCOUNTER
[facility] Call Center    Phone Message    May a detailed message be left on voicemail: yes     Reason for Call: Other: Per pt would like to know if the appt spots that were stated they were held in the other TE is still available, pt would like to take one? Please call pt back to discuss. Thank you!     Action Taken: Message routed to:  [facility] ([facility] C[facility]    Travel Screening: Not Applicable

## 2023-01-11 ENCOUNTER — THERAPY VISIT (OUTPATIENT)
Dept: PHYSICAL THERAPY | Facility: CLINIC | Age: 43
End: 2023-01-11
Payer: COMMERCIAL

## 2023-01-11 DIAGNOSIS — M62.89 PELVIC FLOOR DYSFUNCTION: Primary | ICD-10-CM

## 2023-01-11 PROCEDURE — 97530 THERAPEUTIC ACTIVITIES: CPT | Mod: GP | Performed by: PHYSICAL THERAPIST

## 2023-01-11 PROCEDURE — 97140 MANUAL THERAPY 1/> REGIONS: CPT | Mod: GP | Performed by: PHYSICAL THERAPIST

## 2023-01-11 NOTE — PROGRESS NOTES
DISCHARGE REPORT    SUBJECTIVE  Subjective: The pt notes that when she lays on her left side and then she reaches her arm up overhead she will sometimes have anterior shoulder pain. She reports pelvic exercises are going well and she is not having any symptoms.   Current pain level is 0/10  .    .   Changes in function:  Yes (See Goal flowsheet attached for changes in current functional level)  Adverse reaction to treatment or activity: None    OBJECTIVE  Changes noted in objective findings:  Yes,   Objective: observation: superficial and deep scar tissue restriction along bilateral sides of  incision, ASIS level pt supine     ASSESSMENT/PLAN  Updated problem list and treatment plan: Diagnosis 1:  Pelvic floor dysfunciton  Pain -  hot/cold therapy, US, electric stimulation, mechanical traction, manual therapy, STS, splint/taping/bracing/orthotics, self management, education, directional preference exercise and home program  Decreased ROM/flexibility - manual therapy, therapeutic exercise, therapeutic activity and home program  STG/LTGs have been met or progress has been made towards goals:  Yes (See Goal flow sheet completed today.)  Assessment of Progress: The patient has met all of their long term goals.  Self Management Plans:  Patient is independent in a home treatment program.  Patient is independent in self management of symptoms.  I have re-evaluated this patient and find that the nature, scope, duration and intensity of the therapy is appropriate for the medical condition of the patient.  Jaqui continues to require the following intervention to meet STG and LTG's:  PT    Recommendations:  This patient is ready to be discharged from therapy and continue their home treatment program.    Please refer to the daily flowsheet for treatment today, total treatment time and time spent performing 1:1 timed codes.

## 2023-02-01 ENCOUNTER — MYC MEDICAL ADVICE (OUTPATIENT)
Dept: SURGERY | Facility: CLINIC | Age: 43
End: 2023-02-01
Payer: COMMERCIAL

## 2023-02-08 ENCOUNTER — OFFICE VISIT (OUTPATIENT)
Dept: SURGERY | Facility: CLINIC | Age: 43
End: 2023-02-08
Payer: COMMERCIAL

## 2023-02-08 VITALS — HEART RATE: 96 BPM | SYSTOLIC BLOOD PRESSURE: 123 MMHG | DIASTOLIC BLOOD PRESSURE: 64 MMHG | OXYGEN SATURATION: 98 %

## 2023-02-08 DIAGNOSIS — K64.4 ANAL SKIN TAG: Primary | ICD-10-CM

## 2023-02-08 LAB
LAB DIRECTOR COMMENTS: NORMAL
LAB DIRECTOR DISCLAIMER: NORMAL
LAB DIRECTOR INTERPRETATION: NORMAL
LAB DIRECTOR METHODOLOGY: NORMAL
LAB DIRECTOR RESULTS: NORMAL
SPECIMEN DESCRIPTION: NORMAL

## 2023-02-08 PROCEDURE — 88304 TISSUE EXAM BY PATHOLOGIST: CPT | Mod: 26 | Performed by: PATHOLOGY

## 2023-02-08 PROCEDURE — 99203 OFFICE O/P NEW LOW 30 MIN: CPT | Mod: 25

## 2023-02-08 PROCEDURE — 87624 HPV HI-RISK TYP POOLED RSLT: CPT

## 2023-02-08 PROCEDURE — G0452 MOLECULAR PATHOLOGY INTERPR: HCPCS | Mod: 26 | Performed by: PATHOLOGY

## 2023-02-08 PROCEDURE — 88304 TISSUE EXAM BY PATHOLOGIST: CPT | Mod: TC

## 2023-02-08 PROCEDURE — 46220 EXCISE ANAL EXT TAG/PAPILLA: CPT

## 2023-02-08 ASSESSMENT — PAIN SCALES - GENERAL: PAINLEVEL: NO PAIN (0)

## 2023-02-08 ASSESSMENT — ENCOUNTER SYMPTOMS
NERVOUS/ANXIOUS: 0
DEPRESSION: 0
DECREASED CONCENTRATION: 0
INSOMNIA: 1
PANIC: 0

## 2023-02-08 NOTE — NURSING NOTE
Chief Complaint   Patient presents with     Consult       Vitals:    02/08/23 1548   BP: 123/64   BP Location: Left arm   Patient Position: Sitting   Cuff Size: Adult Regular   Pulse: 96   SpO2: 98%       There is no height or weight on file to calculate BMI.    Jagjit Robins EMT-P

## 2023-02-08 NOTE — LETTER
Date:February 9, 2023      Provider requested that no letter be sent. Do not send.       [facility]

## 2023-02-08 NOTE — PROGRESS NOTES
Colon and Rectal Surgery Consult Clinic Note    Date: 2023     Referring provider:  No referring provider defined for this encounter.     RE: Jaqui Santos  : 1980  ASHLY: 2023    Jaqui Santos is a very pleasant 42 year old female here for an anal skin tag.    Jqaui reports having external hemorrhoids after having kids x2. In the last year or so, she has noticed an anal skin tag that is getting in the way of wiping. She feels like it dangles and is in the way. No pain or irritation. No bleeding. No prior colonoscopy. No family history of colorectal cancer. Baseline bowel habits are daily and soft.     Physical Examination:  /64 (BP Location: Left arm, Patient Position: Sitting, Cuff Size: Adult Regular)   Pulse 96   SpO2 98%   General: alert, oriented, in no acute distress, sitting comfortably  HEENT: moist mucous membranes    Perianal external examination: Exam was chaperoned by Jagjit Robins EMT-P   Perianal skin: Intact with no excoriation or lichenification.  Lesions: No evidence of an external lesion, nodularity, or induration in the perianal region.  Eversion of buttocks: There was not evidence of an anal fissure. Details: N/A.  Skin tags or external hemorrhoids: Yes: a small long anal skin tag on a narrow base in the right lateral position.    Digital rectal examination: Was deferred    Anoscopy: Was deferred    Procedure: Excision of Anal Skin Tag  Prior to the start of the procedure and with procedural staff participation, I verbally confirmed the patient s identity using two indicators, relevant allergies, that the procedure was appropriate and matched the consent or emergent situation, and that the correct equipment/implants were available. Immediately prior to starting the procedure I conducted the Time Out with the procedural staff and re-confirmed the patient s name, procedure, and site/side. (The Joint Commission universal protocol was followed.)  Yes    Sedation  (Moderate or Deep): None  After informed consent was obtained, patient agreed to excision of anal skin tag. All of her questions were answered to her stated satisfaction. Verified that patient is not on any blood thinners.  The skin was cleansed with povidone-iodine solution. The base of the skin tag was infiltrated with 1cc of 1% lidocaine with epinephrine. The skin tag was excised using monopolar electrocautery. There was minimal bleeding. The incision was closed with 1 simple interrupted 4-0 Monocryl suture. She tolerated the procedure well.  Procedure was performed under collaborating agreement with Dr. Tuan Hutton MD, Chief of the Division of Colon and Rectal Surgery    Assessment/Plan: Jaqui Santos is a 42 year old female with a symptomatic anal skin tag. We discussed options for removal. Her skin tag is small but relatively long with a narrow base. She would prefer having it removed in clinic today. Discussed risks including pain, bleeding, infection, and that we cannot promise a good cosmetic result. She voiced understanding and agreed to proceed. She tolerated the procedure well. Specimen sent for pathology. Recommended tylenol, ibuprofen, and warm tub baths. Would like to see her in 2 weeks for a wound check, but she can cancel if she's doing well. In the meantime, start a daily fiber supplement. Patient's questions were answered to her stated satisfaction and she is in agreement with this plan.     Medical history:  Past Medical History:   Diagnosis Date     Cervical vertebral fusion 2017     Chickenpox 1989       Surgical history:  Past Surgical History:   Procedure Laterality Date     ABDOMEN SURGERY       C/SECTION, LOW TRANSVERSE  2015    fetal intolerance, 2 layers     CERVICAL FUSION      C6-C7      SECTION N/A 2019    Procedure:  SECTION;  Surgeon: Abigail Adan MD;  Location:  L+D     TONSILLECTOMY & ADENOIDECTOMY         Problem  list:  Patient Active Problem List    Diagnosis Date Noted     Pelvic floor dysfunction 10/14/2022     Priority: Medium     S/P  section 2019     Priority: Medium     Labor and delivery, indication for care 2019     Priority: Medium       Medications:  Current Outpatient Medications   Medication Sig Dispense Refill     IBUPROFEN PO Take  by mouth.       Omega-3 Fatty Acids (FISH OIL PO) Take  by mouth.       Prenatal Vit-Fe Fumarate-FA (PRENATAL MULTIVITAMIN W/IRON) 27-0.8 MG tablet Take 1 tablet by mouth daily         Allergies:  No Known Allergies    Family history:  Family History   Problem Relation Age of Onset     Respiratory Maternal Grandmother      Respiratory Maternal Grandfather      Cerebrovascular Disease Paternal Grandfather      Alcoholism Mother      Depression Mother      Cancer Maternal Grandfather        Social history:  Social History     Tobacco Use     Smoking status: Former     Smokeless tobacco: Never   Substance Use Topics     Alcohol use: Yes     Comment: Alcoholic Drinks/day: none with pregnancy    Marital status: single.  Occupation: works for [employer].    Nursing Notes:   Jagjit Robins EMT  2023  3:51 PM  Signed  Chief Complaint   Patient presents with     Consult       Vitals:    23 1548   BP: 123/64   BP Location: Left arm   Patient Position: Sitting   Cuff Size: Adult Regular   Pulse: 96   SpO2: 98%       There is no height or weight on file to calculate BMI.    Jagjit SARMIENTO-P         30 minutes spent on the date of encounter (excluding time performing procedures) performing chart review, history and exam, documentation and further activities as noted above with an additional 15 minutes for excision of anal skin tag.     -----------------------------------------------------  Ladonna Nuno PA-C  Colon and Rectal Surgery   [facility]

## 2023-02-08 NOTE — LETTER
2023       RE: Jaqui Santos  [address] [id number]     Dear Colleague,    Thank you for referring your patient, Jaqui Santos, to the [facility] [place] [facility]. Please see a copy of my visit note below.    Colon and Rectal Surgery Consult Clinic Note    Date: 2023     Referring provider:  No referring provider defined for this encounter.     RE: Jaqui Santos  : 1980  ASHLY: 2023    Jaqui Santos is a very pleasant 42 year old female here for an anal skin tag.    Jaqui reports having external hemorrhoids after having kids x2. In the last year or so, she has noticed an anal skin tag that is getting in the way of wiping. She feels like it dangles and is in the way. No pain or irritation. No bleeding. No prior colonoscopy. No family history of colorectal cancer. Baseline bowel habits are daily and soft.     Physical Examination:  /64 (BP Location: Left arm, Patient Position: Sitting, Cuff Size: Adult Regular)   Pulse 96   SpO2 98%   General: alert, oriented, in no acute distress, sitting comfortably  HEENT: moist mucous membranes    Perianal external examination: Exam was chaperoned by Jagjit Robins EMT-P   Perianal skin: Intact with no excoriation or lichenification.  Lesions: No evidence of an external lesion, nodularity, or induration in the perianal region.  Eversion of buttocks: There was not evidence of an anal fissure. Details: N/A.  Skin tags or external hemorrhoids: Yes: a small long anal skin tag on a narrow base in the right lateral position.    Digital rectal examination: Was deferred    Anoscopy: Was deferred    Procedure: Excision of Anal Skin Tag  Prior to the start of the procedure and with procedural staff participation, I verbally confirmed the patient s identity using two indicators, relevant allergies, that the procedure was  appropriate and matched the consent or emergent situation, and that the correct equipment/implants were available. Immediately prior to starting the procedure I conducted the Time Out with the procedural staff and re-confirmed the patient s name, procedure, and site/side. (The Joint Commission universal protocol was followed.)  Yes    Sedation (Moderate or Deep): None  After informed consent was obtained, patient agreed to excision of anal skin tag. All of her questions were answered to her stated satisfaction. Verified that patient is not on any blood thinners.  The skin was cleansed with povidone-iodine solution. The base of the skin tag was infiltrated with 1cc of 1% lidocaine with epinephrine. The skin tag was excised using monopolar electrocautery. There was minimal bleeding. The incision was closed with 1 simple interrupted 4-0 Monocryl suture. She tolerated the procedure well.  Procedure was performed under collaborating agreement with Dr. Tuan Hutton MD, Chief of the Division of Colon and Rectal Surgery    Assessment/Plan: Jaqui Santos is a 42 year old female with a symptomatic anal skin tag. We discussed options for removal. Her skin tag is small but relatively long with a narrow base. She would prefer having it removed in clinic today. Discussed risks including pain, bleeding, infection, and that we cannot promise a good cosmetic result. She voiced understanding and agreed to proceed. She tolerated the procedure well. Specimen sent for pathology. Recommended tylenol, ibuprofen, and warm tub baths. Would like to see her in 2 weeks for a wound check, but she can cancel if she's doing well. In the meantime, start a daily fiber supplement. Patient's questions were answered to her stated satisfaction and she is in agreement with this plan.     Medical history:  Past Medical History:   Diagnosis Date     Cervical vertebral fusion 02/13/2017     Chickenpox 12/1989       Surgical history:  Past  Surgical History:   Procedure Laterality Date     ABDOMEN SURGERY       C/SECTION, LOW TRANSVERSE  2015    fetal intolerance, 2 layers     CERVICAL FUSION      C6-C7      SECTION N/A 2019    Procedure:  SECTION;  Surgeon: Abigail Adan MD;  Location:  L+D     TONSILLECTOMY & ADENOIDECTOMY         Problem list:  Patient Active Problem List    Diagnosis Date Noted     Pelvic floor dysfunction 10/14/2022     Priority: Medium     S/P  section 2019     Priority: Medium     Labor and delivery, indication for care 2019     Priority: Medium       Medications:  Current Outpatient Medications   Medication Sig Dispense Refill     IBUPROFEN PO Take  by mouth.       Omega-3 Fatty Acids (FISH OIL PO) Take  by mouth.       Prenatal Vit-Fe Fumarate-FA (PRENATAL MULTIVITAMIN W/IRON) 27-0.8 MG tablet Take 1 tablet by mouth daily         Allergies:  No Known Allergies    Family history:  Family History   Problem Relation Age of Onset     Respiratory Maternal Grandmother      Respiratory Maternal Grandfather      Cerebrovascular Disease Paternal Grandfather      Alcoholism Mother      Depression Mother      Cancer Maternal Grandfather        Social history:  Social History     Tobacco Use     Smoking status: Former     Smokeless tobacco: Never   Substance Use Topics     Alcohol use: Yes     Comment: Alcoholic Drinks/day: none with pregnancy    Marital status: single.  Occupation: works for "[employer].    Nursing Notes:   Jagjit Robins, EMT  2023  3:51 PM  Signed  Chief Complaint   Patient presents with     Consult       Vitals:    23 1548   BP: 123/64   BP Location: Left arm   Patient Position: Sitting   Cuff Size: Adult Regular   Pulse: 96   SpO2: 98%       There is no height or weight on file to calculate BMI.    Jagjit Robins EMT-P         30 minutes spent on the date of encounter (excluding time performing procedures) performing chart review, history and exam,  documentation and further activities as noted above with an additional 15 minutes for excision of anal skin tag.     -----------------------------------------------------  Ladonna Nuno PA-C  Colon and Rectal Surgery   [facility]        Again, thank you for allowing me to participate in the care of your patient.      Sincerely,    Ladonna Nuno PA-C

## 2023-02-13 LAB
PATH REPORT.COMMENTS IMP SPEC: NORMAL
PATH REPORT.COMMENTS IMP SPEC: NORMAL
PATH REPORT.FINAL DX SPEC: NORMAL
PATH REPORT.GROSS SPEC: NORMAL
PATH REPORT.MICROSCOPIC SPEC OTHER STN: NORMAL
PATH REPORT.RELEVANT HX SPEC: NORMAL
PHOTO IMAGE: NORMAL

## 2023-02-18 ENCOUNTER — HEALTH MAINTENANCE LETTER (OUTPATIENT)
Age: 43
End: 2023-02-18

## 2023-02-28 ENCOUNTER — PRE VISIT (OUTPATIENT)
Dept: SURGERY | Facility: CLINIC | Age: 43
End: 2023-02-28

## 2023-08-18 ENCOUNTER — THERAPY VISIT (OUTPATIENT)
Dept: PHYSICAL THERAPY | Facility: CLINIC | Age: 43
End: 2023-08-18
Payer: COMMERCIAL

## 2023-08-18 DIAGNOSIS — M54.2 NECK PAIN: Primary | ICD-10-CM

## 2023-08-18 PROCEDURE — 97140 MANUAL THERAPY 1/> REGIONS: CPT | Mod: GP | Performed by: PHYSICAL THERAPIST

## 2023-08-18 PROCEDURE — 97530 THERAPEUTIC ACTIVITIES: CPT | Mod: GP | Performed by: PHYSICAL THERAPIST

## 2023-08-18 PROCEDURE — 97110 THERAPEUTIC EXERCISES: CPT | Mod: GP | Performed by: PHYSICAL THERAPIST

## 2023-08-18 NOTE — PROGRESS NOTES
PHYSICAL THERAPY EVALUATION  Type of Visit: Evaluation    See electronic medical record for Abuse and Falls Screening details.    Subjective       Presenting condition or subjective complaint:  pelvic floor, neck pain  Date of onset: 08/18/23    Relevant medical history:   c-sections, c6-7 cervical fusion and discectomy  Prior therapy history for the same diagnosis, illness or injury:    pelvic PT in 2021 and 2016    Employment:    Currently employed  Hobbies/Interests:  physical activity, reading, cooking, gardening, parenting    Patient goals for therapy:  no neck pain, no pain with intercourse    Her right hip was driving her crazy in March-April and left upper shoulder/neck was in pain. In March-April her right hip was limiting her from walking because of how painful it was but it is feeling a lot better now. Currently, she feels that she has reduced range of motion in her neck.     She has been consistent with an at home yoga program.    [place] has been more painful this past year.    L medial scalnes upper trap  Left first rib hypomobility    Cervical rotation  L: 50 degrees, R: 60 degrees    Pain assessment: Pain present     Objective      PELVIC EVALUATION  ADDITIONAL HISTORY:  Sex assigned at birth: Female  Gender identity: Female    Pronouns: She/Her Hers      Bladder History:  Feels bladder filling: Yes  Triggers for feeling of inability to wait to go to the bathroom:      How long can you wait to urinate: long  Gets up at night to urinate:   1  Can stop the flow of urine when urinating: Yes  Volume of urine usually released: -- (varies)   Other issues:    Number of bladder infections in last 12 months:    Fluid intake per day: 60oz      Medications taken for bladder: No     Activities causing urine leak:      Amount of urine typically leaked:    Pads used to help with leaking:          Bowel History:  Frequency of bowel movement:    Consistency of stool: Soft-formed    Ignores the urge to defecate:  No  Other bowel issues:    Length of time spent trying to have a bowel movement:      Sexual Function History:  Sexual orientation: Straight    Sexually active: Yes  Lubrication used: Yes Yes  Pelvic pain: Deep penetration (rectal or vaginal); Initial penetration (rectal or vaginal); Use of tampon    Pain or difficulty with orgasms/erection/ejaculation: No    State of menopause: Perimenopause (have not gone through menopause yet)  Hormone medications: No      Are you currently pregnant: No, Number of previous pregnancies: 2, Number of deliveries: 2, If you have delivered before, did you have any of these issues during delivery:  delivery, Have you been diagnosed with pelvic prolapse or abdominal separation: Yes, Do you get regular exercise: Yes, I do this type of exercise: yoga, bike, run, Have you tried pelvic floor strengthening exercises for 4 weeks: Yes, Do you have any history of trauma that is relevant to your care that you d like to share: Yes, I d like to discuss it with my provider in person.    Discussed reason for referral regarding pelvic health needs and external/internal pelvic floor muscle examination with patient/guardian.  Opportunity provided to ask questions and verbal consent for assessment and intervention was given.    PAIN: Pain Level at Rest: 2/10  Pain Location: cervical spine and shoulder  Pain is Exacerbated By: looking over left shoulder  Pain is Relieved By: rest    POSTURE:  forward head    PALPATION: tension and tenderness to palpation suboccipitals bilaterally as well as scalenes on left side    MOBILITY: hypomobility 1st rib on left side    FLEXIBILITY: tightness pec minor bilaterally    CERVICAL AROM:   Rotation: L: 50 degrees, R: 60 degrees    PELVIC EXAM-Deferred until next session    Assessment & Plan   CLINICAL IMPRESSIONS  Medical Diagnosis: pelvic floor dysfunction and neck pain    Treatment Diagnosis: pelvic floor dysfunction and neck pain   Impression/Assessment:  Patient is a 42 year old female with pelvic pain and neck pain complaints.  The following significant findings have been identified: Pain, Decreased ROM/flexibility, Impaired muscle performance, Decreased activity tolerance, and Impaired posture. These impairments interfere with their ability to perform self care tasks, work tasks, recreational activities, household chores, driving , household mobility, and community mobility as compared to previous level of function.     Clinical Decision Making (Complexity):  Clinical Presentation: Stable/Uncomplicated  Clinical Presentation Rationale: based on medical and personal factors listed in PT evaluation  Clinical Decision Making (Complexity): Low complexity    PLAN OF CARE  Treatment Interventions:  Modalities: Biofeedback, Dry Needling, E-stim, Hot Pack  Interventions: Manual Therapy, Neuromuscular Re-education, Therapeutic Activity, Therapeutic Exercise, Self-Care/Home Management    Long Term Goals     PT Goal 1  Goal Description: Pt will have 0/10 pain when looking over each shoulder when driving.  Rationale: to maximize safety and independence with performance of ADLs and functional tasks  Target Date: 11/12/23  PT Goal 2  Goal Description: Pt will have no greater than 3/10 pain with vaginal penetration.  Rationale: to maximize safety and independence with performance of ADLs and functional tasks  Target Date: 11/12/23      Frequency of Treatment: 1x a week  Duration of Treatment: 12 weeks    Education Assessment:        Risks and benefits of evaluation/treatment have been explained.   Patient/Family/caregiver agrees with Plan of Care.     Evaluation Time:           Signing Clinician: Charisse Love, PT

## 2023-08-20 PROBLEM — M54.2 NECK PAIN: Status: ACTIVE | Noted: 2023-08-20

## 2023-08-25 ENCOUNTER — THERAPY VISIT (OUTPATIENT)
Dept: PHYSICAL THERAPY | Facility: CLINIC | Age: 43
End: 2023-08-25
Payer: COMMERCIAL

## 2023-08-25 DIAGNOSIS — M62.89 PELVIC FLOOR DYSFUNCTION: Primary | ICD-10-CM

## 2023-08-25 DIAGNOSIS — M54.2 NECK PAIN: ICD-10-CM

## 2023-08-25 PROCEDURE — 97140 MANUAL THERAPY 1/> REGIONS: CPT | Mod: GP | Performed by: PHYSICAL THERAPIST

## 2023-08-25 PROCEDURE — 97110 THERAPEUTIC EXERCISES: CPT | Mod: GP | Performed by: PHYSICAL THERAPIST

## 2023-09-15 ENCOUNTER — THERAPY VISIT (OUTPATIENT)
Dept: PHYSICAL THERAPY | Facility: CLINIC | Age: 43
End: 2023-09-15
Payer: COMMERCIAL

## 2023-09-15 DIAGNOSIS — M54.2 NECK PAIN: ICD-10-CM

## 2023-09-15 DIAGNOSIS — M62.89 PELVIC FLOOR DYSFUNCTION: Primary | ICD-10-CM

## 2023-09-15 PROCEDURE — 97140 MANUAL THERAPY 1/> REGIONS: CPT | Mod: GP | Performed by: PHYSICAL THERAPIST

## 2023-09-15 PROCEDURE — 97112 NEUROMUSCULAR REEDUCATION: CPT | Mod: GP | Performed by: PHYSICAL THERAPIST

## 2023-09-15 PROCEDURE — 97530 THERAPEUTIC ACTIVITIES: CPT | Mod: GP | Performed by: PHYSICAL THERAPIST

## 2023-09-29 ENCOUNTER — THERAPY VISIT (OUTPATIENT)
Dept: PHYSICAL THERAPY | Facility: CLINIC | Age: 43
End: 2023-09-29
Payer: COMMERCIAL

## 2023-09-29 DIAGNOSIS — M54.2 NECK PAIN: ICD-10-CM

## 2023-09-29 DIAGNOSIS — M62.89 PELVIC FLOOR DYSFUNCTION: Primary | ICD-10-CM

## 2023-09-29 PROCEDURE — 97110 THERAPEUTIC EXERCISES: CPT | Mod: GP | Performed by: PHYSICAL THERAPIST

## 2023-09-29 PROCEDURE — 97140 MANUAL THERAPY 1/> REGIONS: CPT | Mod: GP | Performed by: PHYSICAL THERAPIST

## 2023-10-13 ENCOUNTER — THERAPY VISIT (OUTPATIENT)
Dept: PHYSICAL THERAPY | Facility: CLINIC | Age: 43
End: 2023-10-13
Payer: COMMERCIAL

## 2023-10-13 DIAGNOSIS — M54.2 NECK PAIN: ICD-10-CM

## 2023-10-13 DIAGNOSIS — M62.89 PELVIC FLOOR DYSFUNCTION: Primary | ICD-10-CM

## 2023-10-13 PROCEDURE — 97140 MANUAL THERAPY 1/> REGIONS: CPT | Mod: GP | Performed by: PHYSICAL THERAPIST

## 2023-10-13 PROCEDURE — 97110 THERAPEUTIC EXERCISES: CPT | Mod: GP | Performed by: PHYSICAL THERAPIST

## 2023-11-10 ENCOUNTER — THERAPY VISIT (OUTPATIENT)
Dept: PHYSICAL THERAPY | Facility: CLINIC | Age: 43
End: 2023-11-10
Payer: COMMERCIAL

## 2023-11-10 DIAGNOSIS — M62.89 PELVIC FLOOR DYSFUNCTION: Primary | ICD-10-CM

## 2023-11-10 DIAGNOSIS — M54.2 NECK PAIN: ICD-10-CM

## 2023-11-10 PROCEDURE — 97110 THERAPEUTIC EXERCISES: CPT | Mod: GP | Performed by: PHYSICAL THERAPIST

## 2023-11-10 PROCEDURE — 97112 NEUROMUSCULAR REEDUCATION: CPT | Mod: GP | Performed by: PHYSICAL THERAPIST

## 2023-11-17 ENCOUNTER — THERAPY VISIT (OUTPATIENT)
Dept: PHYSICAL THERAPY | Facility: CLINIC | Age: 43
End: 2023-11-17
Payer: COMMERCIAL

## 2023-11-17 DIAGNOSIS — M54.2 NECK PAIN: ICD-10-CM

## 2023-11-17 DIAGNOSIS — M62.89 PELVIC FLOOR DYSFUNCTION: Primary | ICD-10-CM

## 2023-11-17 PROCEDURE — 97140 MANUAL THERAPY 1/> REGIONS: CPT | Mod: GP | Performed by: PHYSICAL THERAPIST

## 2023-11-17 PROCEDURE — 97110 THERAPEUTIC EXERCISES: CPT | Mod: GP | Performed by: PHYSICAL THERAPIST

## 2023-12-08 ENCOUNTER — THERAPY VISIT (OUTPATIENT)
Dept: PHYSICAL THERAPY | Facility: CLINIC | Age: 43
End: 2023-12-08
Payer: COMMERCIAL

## 2023-12-08 DIAGNOSIS — M62.89 PELVIC FLOOR DYSFUNCTION: Primary | ICD-10-CM

## 2023-12-08 DIAGNOSIS — M54.2 NECK PAIN: ICD-10-CM

## 2023-12-08 PROCEDURE — 97110 THERAPEUTIC EXERCISES: CPT | Mod: GP | Performed by: PHYSICAL THERAPIST

## 2023-12-08 PROCEDURE — 97140 MANUAL THERAPY 1/> REGIONS: CPT | Mod: GP | Performed by: PHYSICAL THERAPIST

## 2023-12-14 ENCOUNTER — THERAPY VISIT (OUTPATIENT)
Dept: PHYSICAL THERAPY | Facility: CLINIC | Age: 43
End: 2023-12-14
Payer: COMMERCIAL

## 2023-12-14 DIAGNOSIS — M54.2 NECK PAIN: ICD-10-CM

## 2023-12-14 DIAGNOSIS — M62.89 PELVIC FLOOR DYSFUNCTION: Primary | ICD-10-CM

## 2023-12-14 PROCEDURE — 97110 THERAPEUTIC EXERCISES: CPT | Mod: GP | Performed by: PHYSICAL THERAPIST

## 2023-12-14 PROCEDURE — 97140 MANUAL THERAPY 1/> REGIONS: CPT | Mod: GP | Performed by: PHYSICAL THERAPIST

## 2023-12-20 ENCOUNTER — THERAPY VISIT (OUTPATIENT)
Dept: PHYSICAL THERAPY | Facility: CLINIC | Age: 43
End: 2023-12-20
Payer: COMMERCIAL

## 2023-12-20 DIAGNOSIS — M62.89 PELVIC FLOOR DYSFUNCTION: Primary | ICD-10-CM

## 2023-12-20 DIAGNOSIS — M54.2 NECK PAIN: ICD-10-CM

## 2023-12-20 PROCEDURE — 97110 THERAPEUTIC EXERCISES: CPT | Mod: GP | Performed by: PHYSICAL THERAPIST

## 2023-12-20 PROCEDURE — 97140 MANUAL THERAPY 1/> REGIONS: CPT | Mod: GP | Performed by: PHYSICAL THERAPIST

## 2023-12-26 ENCOUNTER — THERAPY VISIT (OUTPATIENT)
Dept: PHYSICAL THERAPY | Facility: CLINIC | Age: 43
End: 2023-12-26
Payer: COMMERCIAL

## 2023-12-26 DIAGNOSIS — M62.89 PELVIC FLOOR DYSFUNCTION: Primary | ICD-10-CM

## 2023-12-26 DIAGNOSIS — M54.2 NECK PAIN: ICD-10-CM

## 2023-12-26 PROCEDURE — 97140 MANUAL THERAPY 1/> REGIONS: CPT | Mod: GP | Performed by: PHYSICAL THERAPIST

## 2023-12-26 PROCEDURE — 97110 THERAPEUTIC EXERCISES: CPT | Mod: GP | Performed by: PHYSICAL THERAPIST

## 2024-03-16 ENCOUNTER — HEALTH MAINTENANCE LETTER (OUTPATIENT)
Age: 44
End: 2024-03-16

## 2025-03-22 ENCOUNTER — HEALTH MAINTENANCE LETTER (OUTPATIENT)
Age: 45
End: 2025-03-22

## (undated) DEVICE — GLOVE PROTEXIS BLUE W/NEU-THERA 7.0  2D73EB70

## (undated) DEVICE — SOL NACL 0.9% IRRIG 1000ML BOTTLE 07138-09

## (undated) DEVICE — SU VICRYL 4-0 PS-2 18" UND J496G

## (undated) DEVICE — BASIN SET MAJOR

## (undated) DEVICE — STOCKING SLEEVE COMPRESSION CALF LG

## (undated) DEVICE — SUCTION CANISTER MEDIVAC LINER 1500ML W/LID 65651-515

## (undated) DEVICE — CATH TRAY FOLEY 16FR SILICONE 907416

## (undated) DEVICE — SOL WATER IRRIG 1000ML BOTTLE 07139-09

## (undated) DEVICE — PACK C-SECTION LF PL15OTA83B

## (undated) DEVICE — SU VICRYL 0 CT-1 36" J346H

## (undated) DEVICE — STRAP KNEE/BODY 31143004

## (undated) DEVICE — GLOVE ESTEEM POWDER FREE SMT 6.5  2D72PT65

## (undated) DEVICE — PREP CHLORAPREP 26ML TINTED ORANGE  260815

## (undated) DEVICE — SU MONOCRYL 0 CT-1 36" Y346H

## (undated) RX ORDER — ACETAMINOPHEN 325 MG/1
TABLET ORAL
Status: DISPENSED
Start: 2019-02-13

## (undated) RX ORDER — FENTANYL CITRATE 50 UG/ML
INJECTION, SOLUTION INTRAMUSCULAR; INTRAVENOUS
Status: DISPENSED
Start: 2019-02-13

## (undated) RX ORDER — KETOROLAC TROMETHAMINE 30 MG/ML
INJECTION, SOLUTION INTRAMUSCULAR; INTRAVENOUS
Status: DISPENSED
Start: 2019-02-13

## (undated) RX ORDER — PHENYLEPHRINE HCL IN 0.9% NACL 1 MG/10 ML
SYRINGE (ML) INTRAVENOUS
Status: DISPENSED
Start: 2019-02-13